# Patient Record
Sex: MALE | Race: WHITE | NOT HISPANIC OR LATINO
[De-identification: names, ages, dates, MRNs, and addresses within clinical notes are randomized per-mention and may not be internally consistent; named-entity substitution may affect disease eponyms.]

---

## 2017-01-05 ENCOUNTER — APPOINTMENT (OUTPATIENT)
Dept: FAMILY MEDICINE | Facility: CLINIC | Age: 37
End: 2017-01-05

## 2017-01-05 VITALS
DIASTOLIC BLOOD PRESSURE: 82 MMHG | HEART RATE: 95 BPM | BODY MASS INDEX: 25.2 KG/M2 | HEIGHT: 70 IN | OXYGEN SATURATION: 94 % | WEIGHT: 176 LBS | SYSTOLIC BLOOD PRESSURE: 130 MMHG

## 2017-01-13 ENCOUNTER — OUTPATIENT (OUTPATIENT)
Dept: OUTPATIENT SERVICES | Facility: HOSPITAL | Age: 37
LOS: 1 days | End: 2017-01-13
Payer: COMMERCIAL

## 2017-01-13 ENCOUNTER — APPOINTMENT (OUTPATIENT)
Dept: MRI IMAGING | Facility: CLINIC | Age: 37
End: 2017-01-13

## 2017-01-13 DIAGNOSIS — Z00.8 ENCOUNTER FOR OTHER GENERAL EXAMINATION: ICD-10-CM

## 2017-01-13 PROCEDURE — A9585: CPT

## 2017-01-13 PROCEDURE — 70553 MRI BRAIN STEM W/O & W/DYE: CPT

## 2017-01-24 ENCOUNTER — APPOINTMENT (OUTPATIENT)
Dept: FAMILY MEDICINE | Facility: CLINIC | Age: 37
End: 2017-01-24

## 2017-01-24 VITALS
HEIGHT: 70 IN | DIASTOLIC BLOOD PRESSURE: 82 MMHG | WEIGHT: 174 LBS | HEART RATE: 94 BPM | OXYGEN SATURATION: 96 % | BODY MASS INDEX: 24.91 KG/M2 | SYSTOLIC BLOOD PRESSURE: 140 MMHG

## 2017-01-31 ENCOUNTER — APPOINTMENT (OUTPATIENT)
Dept: FAMILY MEDICINE | Facility: CLINIC | Age: 37
End: 2017-01-31

## 2017-01-31 ENCOUNTER — NON-APPOINTMENT (OUTPATIENT)
Age: 37
End: 2017-01-31

## 2017-01-31 VITALS
BODY MASS INDEX: 24.91 KG/M2 | HEIGHT: 70 IN | WEIGHT: 174 LBS | DIASTOLIC BLOOD PRESSURE: 70 MMHG | SYSTOLIC BLOOD PRESSURE: 134 MMHG | HEART RATE: 79 BPM

## 2017-01-31 DIAGNOSIS — R06.2 WHEEZING: ICD-10-CM

## 2017-02-01 ENCOUNTER — FORM ENCOUNTER (OUTPATIENT)
Age: 37
End: 2017-02-01

## 2017-02-02 ENCOUNTER — OUTPATIENT (OUTPATIENT)
Dept: OUTPATIENT SERVICES | Facility: HOSPITAL | Age: 37
LOS: 1 days | End: 2017-02-02
Payer: COMMERCIAL

## 2017-02-02 ENCOUNTER — APPOINTMENT (OUTPATIENT)
Dept: MRI IMAGING | Facility: CLINIC | Age: 37
End: 2017-02-02

## 2017-02-02 DIAGNOSIS — R93.0 ABNORMAL FINDINGS ON DIAGNOSTIC IMAGING OF SKULL AND HEAD, NOT ELSEWHERE CLASSIFIED: ICD-10-CM

## 2017-02-02 PROCEDURE — A9585: CPT

## 2017-02-02 PROCEDURE — 70546 MR ANGIOGRAPH HEAD W/O&W/DYE: CPT

## 2017-02-09 ENCOUNTER — APPOINTMENT (OUTPATIENT)
Dept: FAMILY MEDICINE | Facility: CLINIC | Age: 37
End: 2017-02-09

## 2017-02-10 ENCOUNTER — APPOINTMENT (OUTPATIENT)
Dept: FAMILY MEDICINE | Facility: CLINIC | Age: 37
End: 2017-02-10

## 2017-02-10 VITALS
WEIGHT: 166 LBS | BODY MASS INDEX: 23.77 KG/M2 | HEART RATE: 99 BPM | HEIGHT: 70 IN | SYSTOLIC BLOOD PRESSURE: 120 MMHG | DIASTOLIC BLOOD PRESSURE: 70 MMHG | OXYGEN SATURATION: 98 %

## 2017-02-13 RX ORDER — IBUPROFEN 400 MG/1
400 TABLET, FILM COATED ORAL
Qty: 25 | Refills: 0 | Status: ACTIVE | COMMUNITY
Start: 2017-01-26

## 2017-02-15 ENCOUNTER — APPOINTMENT (OUTPATIENT)
Dept: FAMILY MEDICINE | Facility: CLINIC | Age: 37
End: 2017-02-15

## 2017-02-15 VITALS
OXYGEN SATURATION: 92 % | SYSTOLIC BLOOD PRESSURE: 120 MMHG | WEIGHT: 170 LBS | HEIGHT: 70 IN | DIASTOLIC BLOOD PRESSURE: 62 MMHG | BODY MASS INDEX: 24.34 KG/M2 | HEART RATE: 70 BPM

## 2017-02-15 DIAGNOSIS — Z87.09 PERSONAL HISTORY OF OTHER DISEASES OF THE RESPIRATORY SYSTEM: ICD-10-CM

## 2017-02-15 DIAGNOSIS — F41.9 ANXIETY DISORDER, UNSPECIFIED: ICD-10-CM

## 2017-03-07 ENCOUNTER — APPOINTMENT (OUTPATIENT)
Dept: FAMILY MEDICINE | Facility: CLINIC | Age: 37
End: 2017-03-07

## 2017-03-07 VITALS
TEMPERATURE: 99.7 F | HEIGHT: 70 IN | DIASTOLIC BLOOD PRESSURE: 70 MMHG | WEIGHT: 170 LBS | HEART RATE: 90 BPM | SYSTOLIC BLOOD PRESSURE: 120 MMHG | BODY MASS INDEX: 24.34 KG/M2 | OXYGEN SATURATION: 95 %

## 2017-03-07 RX ORDER — PREDNISONE 10 MG/1
10 TABLET ORAL
Qty: 20 | Refills: 0 | Status: COMPLETED | COMMUNITY
Start: 2017-01-31 | End: 2017-03-07

## 2017-03-07 RX ORDER — MILNACIPRAN HYDROCHLORIDE 12.5 MG/1
12.5 TABLET, FILM COATED ORAL
Qty: 120 | Refills: 0 | Status: DISCONTINUED | COMMUNITY
Start: 2016-12-20 | End: 2017-03-07

## 2017-03-10 ENCOUNTER — APPOINTMENT (OUTPATIENT)
Dept: PULMONOLOGY | Facility: CLINIC | Age: 37
End: 2017-03-10

## 2017-03-27 ENCOUNTER — APPOINTMENT (OUTPATIENT)
Dept: PULMONOLOGY | Facility: CLINIC | Age: 37
End: 2017-03-27

## 2017-03-27 VITALS — WEIGHT: 160 LBS | SYSTOLIC BLOOD PRESSURE: 120 MMHG | BODY MASS INDEX: 22.96 KG/M2 | DIASTOLIC BLOOD PRESSURE: 66 MMHG

## 2017-03-27 VITALS — HEART RATE: 105 BPM | OXYGEN SATURATION: 97 %

## 2017-03-27 RX ORDER — LIDOCAINE 5% 700 MG/1
5 PATCH TOPICAL
Qty: 1 | Refills: 3 | Status: DISCONTINUED | COMMUNITY
Start: 2017-01-05 | End: 2017-03-27

## 2017-03-27 RX ORDER — CYCLOBENZAPRINE HYDROCHLORIDE 10 MG/1
10 TABLET, FILM COATED ORAL
Qty: 30 | Refills: 0 | Status: DISCONTINUED | COMMUNITY
Start: 2017-02-15 | End: 2017-03-27

## 2017-04-04 ENCOUNTER — APPOINTMENT (OUTPATIENT)
Dept: RADIOLOGY | Facility: CLINIC | Age: 37
End: 2017-04-04

## 2017-05-16 ENCOUNTER — LABORATORY RESULT (OUTPATIENT)
Age: 37
End: 2017-05-16

## 2017-05-16 ENCOUNTER — APPOINTMENT (OUTPATIENT)
Dept: FAMILY MEDICINE | Facility: CLINIC | Age: 37
End: 2017-05-16

## 2017-05-16 VITALS
OXYGEN SATURATION: 92 % | BODY MASS INDEX: 23.62 KG/M2 | HEIGHT: 70 IN | HEART RATE: 70 BPM | DIASTOLIC BLOOD PRESSURE: 60 MMHG | SYSTOLIC BLOOD PRESSURE: 110 MMHG | WEIGHT: 165 LBS

## 2017-05-25 LAB
AMPHET UR-MCNC: NEGATIVE
BARBITURATES UR-MCNC: NEGATIVE
BENZODIAZ UR-MCNC: NORMAL
COCAINE METAB.OTHER UR-MCNC: NEGATIVE
CREATININE, URINE: >200 MG/DL
METHADONE UR-MCNC: NEGATIVE
METHAQUALONE UR-MCNC: NEGATIVE
OPIATES UR-MCNC: NEGATIVE
PCP UR-MCNC: NEGATIVE
PROPOXYPH UR QL: NEGATIVE
THC UR QL: NORMAL

## 2017-06-21 ENCOUNTER — APPOINTMENT (OUTPATIENT)
Dept: FAMILY MEDICINE | Facility: CLINIC | Age: 37
End: 2017-06-21

## 2017-06-25 ENCOUNTER — FORM ENCOUNTER (OUTPATIENT)
Age: 37
End: 2017-06-25

## 2017-06-26 ENCOUNTER — OUTPATIENT (OUTPATIENT)
Dept: OUTPATIENT SERVICES | Facility: HOSPITAL | Age: 37
LOS: 1 days | End: 2017-06-26
Payer: COMMERCIAL

## 2017-06-26 ENCOUNTER — APPOINTMENT (OUTPATIENT)
Dept: FAMILY MEDICINE | Facility: CLINIC | Age: 37
End: 2017-06-26

## 2017-06-26 ENCOUNTER — APPOINTMENT (OUTPATIENT)
Dept: RADIOLOGY | Facility: CLINIC | Age: 37
End: 2017-06-26

## 2017-06-26 VITALS
DIASTOLIC BLOOD PRESSURE: 62 MMHG | HEART RATE: 104 BPM | TEMPERATURE: 99.1 F | BODY MASS INDEX: 21.05 KG/M2 | HEIGHT: 70 IN | OXYGEN SATURATION: 97 % | WEIGHT: 147 LBS | SYSTOLIC BLOOD PRESSURE: 120 MMHG

## 2017-06-26 DIAGNOSIS — R63.4 ABNORMAL WEIGHT LOSS: ICD-10-CM

## 2017-06-26 DIAGNOSIS — Z00.8 ENCOUNTER FOR OTHER GENERAL EXAMINATION: ICD-10-CM

## 2017-06-26 PROCEDURE — 71046 X-RAY EXAM CHEST 2 VIEWS: CPT

## 2017-06-27 LAB
ALBUMIN SERPL ELPH-MCNC: 4.6 G/DL
ALP BLD-CCNC: 78 U/L
ALT SERPL-CCNC: 9 U/L
ANION GAP SERPL CALC-SCNC: 17 MMOL/L
AST SERPL-CCNC: 15 U/L
BASOPHILS # BLD AUTO: 0.03 K/UL
BASOPHILS NFR BLD AUTO: 0.2 %
BILIRUB SERPL-MCNC: 0.5 MG/DL
BUN SERPL-MCNC: 23 MG/DL
CALCIUM SERPL-MCNC: 9 MG/DL
CHLORIDE SERPL-SCNC: 101 MMOL/L
CO2 SERPL-SCNC: 23 MMOL/L
CREAT SERPL-MCNC: 0.93 MG/DL
CRP SERPL-MCNC: <0.2 MG/DL
EOSINOPHIL # BLD AUTO: 0.28 K/UL
EOSINOPHIL NFR BLD AUTO: 2 %
ERYTHROCYTE [SEDIMENTATION RATE] IN BLOOD BY WESTERGREN METHOD: 2 MM/HR
GLUCOSE SERPL-MCNC: 123 MG/DL
HCT VFR BLD CALC: 47.5 %
HGB BLD-MCNC: 15.9 G/DL
IMM GRANULOCYTES NFR BLD AUTO: 0.4 %
LYMPHOCYTES # BLD AUTO: 3.09 K/UL
LYMPHOCYTES NFR BLD AUTO: 22.3 %
MAN DIFF?: NORMAL
MCHC RBC-ENTMCNC: 30.5 PG
MCHC RBC-ENTMCNC: 33.5 GM/DL
MCV RBC AUTO: 91 FL
MONOCYTES # BLD AUTO: 0.81 K/UL
MONOCYTES NFR BLD AUTO: 5.8 %
NEUTROPHILS # BLD AUTO: 9.58 K/UL
NEUTROPHILS NFR BLD AUTO: 69.3 %
PLATELET # BLD AUTO: 359 K/UL
POTASSIUM SERPL-SCNC: 4 MMOL/L
PREALB SERPL NEPH-MCNC: 26 MG/DL
PROT SERPL-MCNC: 7.3 G/DL
RBC # BLD: 5.22 M/UL
RBC # FLD: 14.4 %
SODIUM SERPL-SCNC: 141 MMOL/L
WBC # FLD AUTO: 13.85 K/UL

## 2017-07-10 ENCOUNTER — APPOINTMENT (OUTPATIENT)
Dept: FAMILY MEDICINE | Facility: CLINIC | Age: 37
End: 2017-07-10

## 2017-07-10 VITALS
HEART RATE: 104 BPM | SYSTOLIC BLOOD PRESSURE: 122 MMHG | HEIGHT: 70 IN | BODY MASS INDEX: 23.91 KG/M2 | OXYGEN SATURATION: 96 % | WEIGHT: 167 LBS | TEMPERATURE: 98.9 F | DIASTOLIC BLOOD PRESSURE: 62 MMHG

## 2017-07-10 DIAGNOSIS — M62.81 MUSCLE WEAKNESS (GENERALIZED): ICD-10-CM

## 2017-07-10 RX ORDER — TESTOSTERONE 25 MG/2.5G
25 MG/2.5GM GEL TRANSDERMAL
Qty: 75 | Refills: 0 | Status: COMPLETED | COMMUNITY
Start: 2017-01-04 | End: 2017-07-10

## 2017-07-11 LAB
ALBUMIN SERPL ELPH-MCNC: 4.4 G/DL
ALP BLD-CCNC: 68 U/L
ALT SERPL-CCNC: 43 U/L
ANION GAP SERPL CALC-SCNC: 19 MMOL/L
AST SERPL-CCNC: 24 U/L
BASOPHILS # BLD AUTO: 0.04 K/UL
BASOPHILS NFR BLD AUTO: 0.3 %
BILIRUB SERPL-MCNC: 0.2 MG/DL
BUN SERPL-MCNC: 23 MG/DL
CALCIUM SERPL-MCNC: 9.3 MG/DL
CHLORIDE SERPL-SCNC: 100 MMOL/L
CO2 SERPL-SCNC: 22 MMOL/L
CREAT SERPL-MCNC: 1.1 MG/DL
EOSINOPHIL # BLD AUTO: 0.38 K/UL
EOSINOPHIL NFR BLD AUTO: 3.1 %
GLUCOSE SERPL-MCNC: 101 MG/DL
HCT VFR BLD CALC: 41 %
HGB BLD-MCNC: 13.5 G/DL
IMM GRANULOCYTES NFR BLD AUTO: 0.2 %
LYMPHOCYTES # BLD AUTO: 4.05 K/UL
LYMPHOCYTES NFR BLD AUTO: 33.6 %
MAN DIFF?: NORMAL
MCHC RBC-ENTMCNC: 29.7 PG
MCHC RBC-ENTMCNC: 32.9 GM/DL
MCV RBC AUTO: 90.1 FL
MONOCYTES # BLD AUTO: 0.91 K/UL
MONOCYTES NFR BLD AUTO: 7.5 %
NEUTROPHILS # BLD AUTO: 6.66 K/UL
NEUTROPHILS NFR BLD AUTO: 55.3 %
PLATELET # BLD AUTO: 305 K/UL
POTASSIUM SERPL-SCNC: 4.3 MMOL/L
PROT SERPL-MCNC: 6.7 G/DL
RBC # BLD: 4.55 M/UL
RBC # FLD: 14.2 %
SODIUM SERPL-SCNC: 141 MMOL/L
WBC # FLD AUTO: 12.07 K/UL

## 2017-07-26 ENCOUNTER — APPOINTMENT (OUTPATIENT)
Dept: FAMILY MEDICINE | Facility: CLINIC | Age: 37
End: 2017-07-26

## 2017-08-02 ENCOUNTER — OTHER (OUTPATIENT)
Age: 37
End: 2017-08-02

## 2017-08-17 ENCOUNTER — APPOINTMENT (OUTPATIENT)
Dept: FAMILY MEDICINE | Facility: CLINIC | Age: 37
End: 2017-08-17
Payer: COMMERCIAL

## 2017-08-17 VITALS
OXYGEN SATURATION: 97 % | BODY MASS INDEX: 25.34 KG/M2 | HEIGHT: 70 IN | DIASTOLIC BLOOD PRESSURE: 84 MMHG | SYSTOLIC BLOOD PRESSURE: 136 MMHG | WEIGHT: 177 LBS | HEART RATE: 88 BPM

## 2017-08-17 DIAGNOSIS — F17.200 NICOTINE DEPENDENCE, UNSPECIFIED, UNCOMPLICATED: ICD-10-CM

## 2017-08-17 PROCEDURE — 99214 OFFICE O/P EST MOD 30 MIN: CPT

## 2017-08-18 LAB
ALBUMIN SERPL ELPH-MCNC: 4.8 G/DL
ALP BLD-CCNC: 80 U/L
ALT SERPL-CCNC: 12 U/L
ANION GAP SERPL CALC-SCNC: 17 MMOL/L
AST SERPL-CCNC: 23 U/L
BASOPHILS # BLD AUTO: 0.05 K/UL
BASOPHILS NFR BLD AUTO: 0.5 %
BILIRUB SERPL-MCNC: 0.2 MG/DL
BUN SERPL-MCNC: 20 MG/DL
CALCIUM SERPL-MCNC: 9.7 MG/DL
CHLORIDE SERPL-SCNC: 99 MMOL/L
CO2 SERPL-SCNC: 25 MMOL/L
CREAT SERPL-MCNC: 1.29 MG/DL
EOSINOPHIL # BLD AUTO: 0.24 K/UL
EOSINOPHIL NFR BLD AUTO: 2.3 %
GLUCOSE SERPL-MCNC: 85 MG/DL
HCT VFR BLD CALC: 46.4 %
HGB BLD-MCNC: 15.1 G/DL
IMM GRANULOCYTES NFR BLD AUTO: 0.4 %
LYMPHOCYTES # BLD AUTO: 3.25 K/UL
LYMPHOCYTES NFR BLD AUTO: 30.5 %
MAN DIFF?: NORMAL
MCHC RBC-ENTMCNC: 30 PG
MCHC RBC-ENTMCNC: 32.5 GM/DL
MCV RBC AUTO: 92.1 FL
MONOCYTES # BLD AUTO: 0.72 K/UL
MONOCYTES NFR BLD AUTO: 6.8 %
NEUTROPHILS # BLD AUTO: 6.36 K/UL
NEUTROPHILS NFR BLD AUTO: 59.5 %
PLATELET # BLD AUTO: 289 K/UL
POTASSIUM SERPL-SCNC: 5.1 MMOL/L
PROT SERPL-MCNC: 7.3 G/DL
RBC # BLD: 5.04 M/UL
RBC # FLD: 14 %
SODIUM SERPL-SCNC: 141 MMOL/L
WBC # FLD AUTO: 10.66 K/UL

## 2017-08-29 PROBLEM — F17.200 NICOTINE DEPENDENCE: Status: ACTIVE | Noted: 2017-03-27

## 2017-09-12 ENCOUNTER — APPOINTMENT (OUTPATIENT)
Dept: GASTROENTEROLOGY | Facility: CLINIC | Age: 37
End: 2017-09-12

## 2017-09-15 ENCOUNTER — APPOINTMENT (OUTPATIENT)
Dept: FAMILY MEDICINE | Facility: CLINIC | Age: 37
End: 2017-09-15
Payer: COMMERCIAL

## 2017-09-15 VITALS
WEIGHT: 180 LBS | HEART RATE: 76 BPM | SYSTOLIC BLOOD PRESSURE: 120 MMHG | DIASTOLIC BLOOD PRESSURE: 82 MMHG | OXYGEN SATURATION: 98 % | HEIGHT: 70 IN | BODY MASS INDEX: 25.77 KG/M2

## 2017-09-15 PROCEDURE — 99406 BEHAV CHNG SMOKING 3-10 MIN: CPT

## 2017-09-15 PROCEDURE — 99214 OFFICE O/P EST MOD 30 MIN: CPT

## 2017-09-28 ENCOUNTER — APPOINTMENT (OUTPATIENT)
Dept: PULMONOLOGY | Facility: CLINIC | Age: 37
End: 2017-09-28

## 2017-10-19 ENCOUNTER — APPOINTMENT (OUTPATIENT)
Dept: FAMILY MEDICINE | Facility: CLINIC | Age: 37
End: 2017-10-19
Payer: COMMERCIAL

## 2017-10-19 VITALS
SYSTOLIC BLOOD PRESSURE: 128 MMHG | HEART RATE: 101 BPM | OXYGEN SATURATION: 96 % | DIASTOLIC BLOOD PRESSURE: 64 MMHG | BODY MASS INDEX: 26.05 KG/M2 | WEIGHT: 182 LBS | HEIGHT: 70 IN

## 2017-10-19 DIAGNOSIS — Z00.00 ENCOUNTER FOR GENERAL ADULT MEDICAL EXAMINATION W/OUT ABNORMAL FINDINGS: ICD-10-CM

## 2017-10-19 PROCEDURE — 99214 OFFICE O/P EST MOD 30 MIN: CPT

## 2017-10-19 RX ORDER — TESTOSTERONE 30 MG/1.5ML
30 SOLUTION TOPICAL
Refills: 0 | Status: DISCONTINUED | COMMUNITY
End: 2017-10-19

## 2017-10-21 LAB
25(OH)D3 SERPL-MCNC: 35.1 NG/ML
ALBUMIN SERPL ELPH-MCNC: 4.8 G/DL
ALP BLD-CCNC: 84 U/L
ALT SERPL-CCNC: 13 U/L
ANION GAP SERPL CALC-SCNC: 17 MMOL/L
AST SERPL-CCNC: 15 U/L
BASOPHILS # BLD AUTO: 0.04 K/UL
BASOPHILS NFR BLD AUTO: 0.3 %
BILIRUB SERPL-MCNC: 0.2 MG/DL
BUN SERPL-MCNC: 14 MG/DL
CALCIUM SERPL-MCNC: 9.4 MG/DL
CHLORIDE SERPL-SCNC: 103 MMOL/L
CO2 SERPL-SCNC: 23 MMOL/L
CREAT SERPL-MCNC: 1.54 MG/DL
EOSINOPHIL # BLD AUTO: 0.13 K/UL
EOSINOPHIL NFR BLD AUTO: 0.8 %
GLUCOSE SERPL-MCNC: 98 MG/DL
HCT VFR BLD CALC: 45.3 %
HGB BLD-MCNC: 15.8 G/DL
IMM GRANULOCYTES NFR BLD AUTO: 0.3 %
LYMPHOCYTES # BLD AUTO: 3.47 K/UL
LYMPHOCYTES NFR BLD AUTO: 22.2 %
MAN DIFF?: NORMAL
MCHC RBC-ENTMCNC: 32.2 PG
MCHC RBC-ENTMCNC: 34.9 GM/DL
MCV RBC AUTO: 92.3 FL
MONOCYTES # BLD AUTO: 0.93 K/UL
MONOCYTES NFR BLD AUTO: 6 %
NEUTROPHILS # BLD AUTO: 10.98 K/UL
NEUTROPHILS NFR BLD AUTO: 70.4 %
PLATELET # BLD AUTO: 351 K/UL
POTASSIUM SERPL-SCNC: 4.2 MMOL/L
PROT SERPL-MCNC: 7.2 G/DL
RBC # BLD: 4.91 M/UL
RBC # FLD: 13.4 %
SODIUM SERPL-SCNC: 143 MMOL/L
WBC # FLD AUTO: 15.6 K/UL

## 2017-11-20 ENCOUNTER — APPOINTMENT (OUTPATIENT)
Dept: HEMATOLOGY ONCOLOGY | Facility: CLINIC | Age: 37
End: 2017-11-20
Payer: COMMERCIAL

## 2017-11-20 ENCOUNTER — LABORATORY RESULT (OUTPATIENT)
Age: 37
End: 2017-11-20

## 2017-11-20 VITALS
BODY MASS INDEX: 26.52 KG/M2 | HEART RATE: 102 BPM | HEIGHT: 70 IN | TEMPERATURE: 98.7 F | SYSTOLIC BLOOD PRESSURE: 150 MMHG | WEIGHT: 185.25 LBS | DIASTOLIC BLOOD PRESSURE: 78 MMHG

## 2017-11-20 DIAGNOSIS — Z80.8 FAMILY HISTORY OF MALIGNANT NEOPLASM OF OTHER ORGANS OR SYSTEMS: ICD-10-CM

## 2017-11-20 DIAGNOSIS — R51 HEADACHE: ICD-10-CM

## 2017-11-20 DIAGNOSIS — Z86.2 PERSONAL HISTORY OF DISEASES OF THE BLOOD AND BLOOD-FORMING ORGANS AND CERTAIN DISORDERS INVOLVING THE IMMUNE MECHANISM: ICD-10-CM

## 2017-11-20 DIAGNOSIS — Z87.09 PERSONAL HISTORY OF OTHER DISEASES OF THE RESPIRATORY SYSTEM: ICD-10-CM

## 2017-11-20 LAB
HCT VFR BLD CALC: 51.9 %
HGB BLD-MCNC: 17.1 G/DL
MCHC RBC-ENTMCNC: 30.6 PG
MCHC RBC-ENTMCNC: 32.9 GM/DL
MCV RBC AUTO: 92.9 FL
PLATELET # BLD AUTO: 351 K/UL
RBC # BLD: 5.59 M/UL
RBC # FLD: 11.8 %
WBC # FLD AUTO: 11.7 K/UL

## 2017-11-20 PROCEDURE — 99204 OFFICE O/P NEW MOD 45 MIN: CPT | Mod: 25

## 2017-11-20 PROCEDURE — 85025 COMPLETE CBC W/AUTO DIFF WBC: CPT

## 2017-11-20 PROCEDURE — 36415 COLL VENOUS BLD VENIPUNCTURE: CPT

## 2017-11-20 RX ORDER — TESTOSTERONE CYPIONATE, ALCOHOL 200 MG/ML
200 KIT INTRAMUSCULAR
Refills: 0 | Status: ACTIVE | COMMUNITY
Start: 2017-11-20

## 2017-11-20 RX ORDER — VARENICLINE TARTRATE 0.5 (11)-1
0.5 MG X 11 & KIT ORAL
Qty: 1 | Refills: 0 | Status: DISCONTINUED | COMMUNITY
Start: 2017-08-17 | End: 2017-11-20

## 2017-11-21 LAB
ALBUMIN SERPL ELPH-MCNC: 4.8 G/DL
ALP BLD-CCNC: 89 U/L
ALT SERPL-CCNC: 14 U/L
ANION GAP SERPL CALC-SCNC: 15 MMOL/L
AST SERPL-CCNC: 18 U/L
BILIRUB SERPL-MCNC: 0.2 MG/DL
BUN SERPL-MCNC: 13 MG/DL
CALCIUM SERPL-MCNC: 9.1 MG/DL
CHLORIDE SERPL-SCNC: 104 MMOL/L
CO2 SERPL-SCNC: 24 MMOL/L
CREAT SERPL-MCNC: 0.94 MG/DL
GLUCOSE SERPL-MCNC: 91 MG/DL
HAV IGM SER QL: NONREACTIVE
HAV IGM SER QL: NONREACTIVE
HBV CORE IGG+IGM SER QL: NONREACTIVE
HBV CORE IGM SER QL: NONREACTIVE
HBV CORE IGM SER QL: NONREACTIVE
HBV SURFACE AB SER QL: REACTIVE
HBV SURFACE AG SER QL: NONREACTIVE
HBV SURFACE AG SER QL: NONREACTIVE
HCV AB SER QL: NONREACTIVE
HCV S/CO RATIO: 0.16 S/CO
LDH SERPL-CCNC: 191 U/L
POTASSIUM SERPL-SCNC: 4.2 MMOL/L
PROT SERPL-MCNC: 7.3 G/DL
SODIUM SERPL-SCNC: 143 MMOL/L

## 2017-11-22 LAB — HBV E AG SER QL: NEGATIVE

## 2017-11-27 LAB
HCV RNA SERPL NAA DL=5-ACNC: NOT DETECTED IU/ML
HCV RNA SERPL NAA+PROBE-LOG IU: NOT DETECTED LOGIU/ML

## 2017-12-21 ENCOUNTER — LABORATORY RESULT (OUTPATIENT)
Age: 37
End: 2017-12-21

## 2017-12-21 ENCOUNTER — APPOINTMENT (OUTPATIENT)
Dept: HEMATOLOGY ONCOLOGY | Facility: CLINIC | Age: 37
End: 2017-12-21
Payer: COMMERCIAL

## 2017-12-21 VITALS
DIASTOLIC BLOOD PRESSURE: 77 MMHG | HEIGHT: 70 IN | HEART RATE: 105 BPM | WEIGHT: 190 LBS | BODY MASS INDEX: 27.2 KG/M2 | SYSTOLIC BLOOD PRESSURE: 151 MMHG | TEMPERATURE: 99.1 F

## 2017-12-21 LAB
HCT VFR BLD CALC: 48.8 %
HGB BLD-MCNC: 16.3 G/DL
MCHC RBC-ENTMCNC: 30.4 PG
MCHC RBC-ENTMCNC: 33.5 GM/DL
MCV RBC AUTO: 90.9 FL
PLATELET # BLD AUTO: 309 K/UL
RBC # BLD: 5.37 M/UL
RBC # FLD: 12.4 %
WBC # FLD AUTO: 15.3 K/UL

## 2017-12-21 PROCEDURE — 99215 OFFICE O/P EST HI 40 MIN: CPT | Mod: 25

## 2017-12-21 PROCEDURE — 85025 COMPLETE CBC W/AUTO DIFF WBC: CPT

## 2017-12-21 PROCEDURE — 36415 COLL VENOUS BLD VENIPUNCTURE: CPT

## 2018-01-16 ENCOUNTER — APPOINTMENT (OUTPATIENT)
Dept: FAMILY MEDICINE | Facility: CLINIC | Age: 38
End: 2018-01-16

## 2018-01-22 ENCOUNTER — APPOINTMENT (OUTPATIENT)
Dept: FAMILY MEDICINE | Facility: CLINIC | Age: 38
End: 2018-01-22
Payer: COMMERCIAL

## 2018-01-22 VITALS
HEIGHT: 70 IN | OXYGEN SATURATION: 94 % | TEMPERATURE: 98.6 F | WEIGHT: 191 LBS | HEART RATE: 105 BPM | BODY MASS INDEX: 27.35 KG/M2

## 2018-01-22 PROCEDURE — 99214 OFFICE O/P EST MOD 30 MIN: CPT

## 2018-01-22 RX ORDER — BUDESONIDE AND FORMOTEROL FUMARATE DIHYDRATE 160; 4.5 UG/1; UG/1
160-4.5 AEROSOL RESPIRATORY (INHALATION) TWICE DAILY
Qty: 1 | Refills: 3 | Status: DISCONTINUED | COMMUNITY
Start: 2017-02-10 | End: 2018-01-22

## 2018-01-26 ENCOUNTER — APPOINTMENT (OUTPATIENT)
Dept: HEMATOLOGY ONCOLOGY | Facility: CLINIC | Age: 38
End: 2018-01-26

## 2018-02-22 ENCOUNTER — APPOINTMENT (OUTPATIENT)
Dept: FAMILY MEDICINE | Facility: CLINIC | Age: 38
End: 2018-02-22
Payer: COMMERCIAL

## 2018-02-22 VITALS
WEIGHT: 194 LBS | HEIGHT: 70 IN | BODY MASS INDEX: 27.77 KG/M2 | OXYGEN SATURATION: 94 % | SYSTOLIC BLOOD PRESSURE: 136 MMHG | DIASTOLIC BLOOD PRESSURE: 78 MMHG | HEART RATE: 83 BPM

## 2018-02-22 DIAGNOSIS — Z72.0 TOBACCO USE: ICD-10-CM

## 2018-02-22 PROCEDURE — 99214 OFFICE O/P EST MOD 30 MIN: CPT

## 2018-02-24 PROBLEM — Z72.0 TOBACCO ABUSE: Status: ACTIVE | Noted: 2017-09-17

## 2018-04-10 ENCOUNTER — APPOINTMENT (OUTPATIENT)
Dept: FAMILY MEDICINE | Facility: CLINIC | Age: 38
End: 2018-04-10
Payer: COMMERCIAL

## 2018-04-10 VITALS
HEIGHT: 70 IN | DIASTOLIC BLOOD PRESSURE: 74 MMHG | SYSTOLIC BLOOD PRESSURE: 134 MMHG | BODY MASS INDEX: 27.2 KG/M2 | OXYGEN SATURATION: 95 % | HEART RATE: 92 BPM | WEIGHT: 190 LBS

## 2018-04-10 PROCEDURE — 36415 COLL VENOUS BLD VENIPUNCTURE: CPT

## 2018-04-10 PROCEDURE — 99214 OFFICE O/P EST MOD 30 MIN: CPT | Mod: 25

## 2018-04-10 RX ORDER — DULOXETINE HYDROCHLORIDE 30 MG/1
30 CAPSULE, DELAYED RELEASE PELLETS ORAL
Qty: 30 | Refills: 1 | Status: DISCONTINUED | COMMUNITY
Start: 2018-01-22 | End: 2018-04-10

## 2018-04-26 ENCOUNTER — APPOINTMENT (OUTPATIENT)
Dept: FAMILY MEDICINE | Facility: CLINIC | Age: 38
End: 2018-04-26
Payer: COMMERCIAL

## 2018-04-26 VITALS
BODY MASS INDEX: 26.92 KG/M2 | HEIGHT: 70 IN | DIASTOLIC BLOOD PRESSURE: 86 MMHG | OXYGEN SATURATION: 97 % | SYSTOLIC BLOOD PRESSURE: 138 MMHG | HEART RATE: 86 BPM | WEIGHT: 188 LBS

## 2018-04-26 LAB
25(OH)D3 SERPL-MCNC: 30.3 NG/ML
ALBUMIN SERPL ELPH-MCNC: 5.1 G/DL
ALP BLD-CCNC: 95 U/L
ALT SERPL-CCNC: 19 U/L
AMPHET UR-MCNC: NEGATIVE
ANION GAP SERPL CALC-SCNC: 18 MMOL/L
APPEARANCE: CLEAR
AST SERPL-CCNC: 22 U/L
BACTERIA: NEGATIVE
BARBITURATES UR-MCNC: NEGATIVE
BASOPHILS # BLD AUTO: 0.05 K/UL
BASOPHILS NFR BLD AUTO: 0.3 %
BENZODIAZ UR-MCNC: NEGATIVE
BILIRUB SERPL-MCNC: 0.3 MG/DL
BILIRUBIN URINE: NEGATIVE
BLOOD URINE: NEGATIVE
BUN SERPL-MCNC: 8 MG/DL
CALCIUM SERPL-MCNC: 8.8 MG/DL
CHLORIDE SERPL-SCNC: 97 MMOL/L
CHOLEST SERPL-MCNC: 216 MG/DL
CHOLEST/HDLC SERPL: 4.1 RATIO
CO2 SERPL-SCNC: 25 MMOL/L
COCAINE METAB.OTHER UR-MCNC: NEGATIVE
COLOR: YELLOW
CREAT SERPL-MCNC: 1.06 MG/DL
CREATININE, URINE: 123.2 MG/DL
EOSINOPHIL # BLD AUTO: 0.31 K/UL
EOSINOPHIL NFR BLD AUTO: 1.9 %
FOLATE SERPL-MCNC: 8.8 NG/ML
FSH SERPL-MCNC: <0.1 IU/L
GLUCOSE QUALITATIVE U: NEGATIVE MG/DL
GLUCOSE SERPL-MCNC: 63 MG/DL
HCT VFR BLD CALC: 49.3 %
HDLC SERPL-MCNC: 53 MG/DL
HGB BLD-MCNC: 16.7 G/DL
HYALINE CASTS: 0 /LPF
IMM GRANULOCYTES NFR BLD AUTO: 0.4 %
KETONES URINE: ABNORMAL
LDLC SERPL CALC-MCNC: 127 MG/DL
LEUKOCYTE ESTERASE URINE: NEGATIVE
LH SERPL-ACNC: <0.1 IU/L
LYMPHOCYTES # BLD AUTO: 3.35 K/UL
LYMPHOCYTES NFR BLD AUTO: 20.1 %
MAN DIFF?: NORMAL
MCHC RBC-ENTMCNC: 31 PG
MCHC RBC-ENTMCNC: 33.9 GM/DL
MCV RBC AUTO: 91.6 FL
METHADONE UR-MCNC: NEGATIVE
METHAQUALONE UR-MCNC: NEGATIVE
MICROSCOPIC-UA: NORMAL
MONOCYTES # BLD AUTO: 1.21 K/UL
MONOCYTES NFR BLD AUTO: 7.3 %
NEUTROPHILS # BLD AUTO: 11.67 K/UL
NEUTROPHILS NFR BLD AUTO: 70 %
NITRITE URINE: NEGATIVE
OPIATES UR-MCNC: NEGATIVE
PCP UR-MCNC: NEGATIVE
PH URINE: 6
PLATELET # BLD AUTO: 343 K/UL
POTASSIUM SERPL-SCNC: 4 MMOL/L
PROPOXYPH UR QL: NEGATIVE
PROT SERPL-MCNC: 8.1 G/DL
PROTEIN URINE: NEGATIVE MG/DL
RBC # BLD: 5.38 M/UL
RBC # FLD: 14.6 %
RED BLOOD CELLS URINE: 2 /HPF
SODIUM SERPL-SCNC: 140 MMOL/L
SPECIFIC GRAVITY URINE: 1.02
SQUAMOUS EPITHELIAL CELLS: 1 /HPF
TESTOST BND SERPL-MCNC: 35.7 PG/ML
TESTOST SERPL-MCNC: 1476.8 NG/DL
THC UR QL: NEGATIVE
TRIGL SERPL-MCNC: 181 MG/DL
TSH SERPL-ACNC: 3.39 UIU/ML
UROBILINOGEN URINE: NEGATIVE MG/DL
VIT B12 SERPL-MCNC: 548 PG/ML
WBC # FLD AUTO: 16.65 K/UL
WHITE BLOOD CELLS URINE: 1 /HPF

## 2018-04-26 PROCEDURE — 99214 OFFICE O/P EST MOD 30 MIN: CPT

## 2018-04-27 ENCOUNTER — APPOINTMENT (OUTPATIENT)
Dept: FAMILY MEDICINE | Facility: CLINIC | Age: 38
End: 2018-04-27
Payer: COMMERCIAL

## 2018-04-27 VITALS
HEART RATE: 96 BPM | OXYGEN SATURATION: 95 % | DIASTOLIC BLOOD PRESSURE: 76 MMHG | BODY MASS INDEX: 26.92 KG/M2 | SYSTOLIC BLOOD PRESSURE: 148 MMHG | WEIGHT: 188 LBS | HEIGHT: 70 IN

## 2018-04-27 DIAGNOSIS — R93.0 ABNORMAL FINDINGS ON DIAGNOSTIC IMAGING OF SKULL AND HEAD, NOT ELSEWHERE CLASSIFIED: ICD-10-CM

## 2018-04-27 PROCEDURE — 99214 OFFICE O/P EST MOD 30 MIN: CPT

## 2018-04-30 PROBLEM — R93.0 ABNORMAL MRI OF HEAD: Status: ACTIVE | Noted: 2017-01-31

## 2018-05-01 ENCOUNTER — APPOINTMENT (OUTPATIENT)
Dept: FAMILY MEDICINE | Facility: CLINIC | Age: 38
End: 2018-05-01

## 2018-05-01 ENCOUNTER — APPOINTMENT (OUTPATIENT)
Dept: UROLOGY | Facility: CLINIC | Age: 38
End: 2018-05-01

## 2018-05-04 ENCOUNTER — APPOINTMENT (OUTPATIENT)
Dept: PAIN MANAGEMENT | Facility: CLINIC | Age: 38
End: 2018-05-04
Payer: COMMERCIAL

## 2018-05-04 DIAGNOSIS — F41.8 OTHER SPECIFIED ANXIETY DISORDERS: ICD-10-CM

## 2018-05-04 PROCEDURE — 99204 OFFICE O/P NEW MOD 45 MIN: CPT

## 2018-05-11 ENCOUNTER — APPOINTMENT (OUTPATIENT)
Dept: FAMILY MEDICINE | Facility: CLINIC | Age: 38
End: 2018-05-11

## 2018-05-15 ENCOUNTER — APPOINTMENT (OUTPATIENT)
Dept: FAMILY MEDICINE | Facility: CLINIC | Age: 38
End: 2018-05-15
Payer: COMMERCIAL

## 2018-05-15 VITALS
HEIGHT: 70 IN | OXYGEN SATURATION: 95 % | BODY MASS INDEX: 27.63 KG/M2 | WEIGHT: 193 LBS | DIASTOLIC BLOOD PRESSURE: 76 MMHG | SYSTOLIC BLOOD PRESSURE: 138 MMHG | HEART RATE: 108 BPM

## 2018-05-15 DIAGNOSIS — E34.9 ENDOCRINE DISORDER, UNSPECIFIED: ICD-10-CM

## 2018-05-15 PROCEDURE — 99214 OFFICE O/P EST MOD 30 MIN: CPT

## 2018-05-15 RX ORDER — ALBUTEROL SULFATE 90 UG/1
108 (90 BASE) AEROSOL, METERED RESPIRATORY (INHALATION)
Qty: 6.7 | Refills: 3 | Status: ACTIVE | COMMUNITY
Start: 2017-03-27 | End: 1900-01-01

## 2018-05-22 ENCOUNTER — APPOINTMENT (OUTPATIENT)
Dept: NEUROLOGY | Facility: CLINIC | Age: 38
End: 2018-05-22

## 2018-05-30 NOTE — PHYSICAL EXAM

## 2018-05-30 NOTE — REVIEW OF SYSTEMS
[Nasal Discharge] : nasal discharge [Wheezing] : wheezing [Skin Rash] : skin rash [Anxiety] : anxiety [Negative] : Cardiovascular

## 2018-05-30 NOTE — HISTORY OF PRESENT ILLNESS
[FreeTextEntry1] : Patient here for follow up on  Anxiety  [de-identified] : saw Dr. webster on May 10th\par \par \par has rash in groin region\par \par

## 2018-06-12 ENCOUNTER — APPOINTMENT (OUTPATIENT)
Dept: UROLOGY | Facility: CLINIC | Age: 38
End: 2018-06-12

## 2018-06-19 ENCOUNTER — APPOINTMENT (OUTPATIENT)
Dept: FAMILY MEDICINE | Facility: CLINIC | Age: 38
End: 2018-06-19
Payer: COMMERCIAL

## 2018-06-19 ENCOUNTER — LABORATORY RESULT (OUTPATIENT)
Age: 38
End: 2018-06-19

## 2018-06-19 VITALS
OXYGEN SATURATION: 97 % | BODY MASS INDEX: 26.48 KG/M2 | SYSTOLIC BLOOD PRESSURE: 138 MMHG | HEART RATE: 94 BPM | DIASTOLIC BLOOD PRESSURE: 76 MMHG | HEIGHT: 70 IN | WEIGHT: 185 LBS

## 2018-06-19 PROCEDURE — 99214 OFFICE O/P EST MOD 30 MIN: CPT

## 2018-06-19 RX ORDER — NYSTATIN 100000 U/G
100000 OINTMENT TOPICAL
Qty: 1 | Refills: 3 | Status: COMPLETED | COMMUNITY
Start: 2018-05-15 | End: 2018-06-19

## 2018-06-19 RX ORDER — CEPHALEXIN 500 MG/1
500 CAPSULE ORAL
Qty: 14 | Refills: 0 | Status: COMPLETED | COMMUNITY
Start: 2018-05-15 | End: 2018-06-19

## 2018-06-20 ENCOUNTER — OTHER (OUTPATIENT)
Age: 38
End: 2018-06-20

## 2018-06-20 DIAGNOSIS — R73.9 HYPERGLYCEMIA, UNSPECIFIED: ICD-10-CM

## 2018-06-20 LAB
25(OH)D3 SERPL-MCNC: 37.4 NG/ML
ALBUMIN SERPL ELPH-MCNC: 4.7 G/DL
ALP BLD-CCNC: 76 U/L
ALT SERPL-CCNC: 15 U/L
ANION GAP SERPL CALC-SCNC: 18 MMOL/L
AST SERPL-CCNC: 18 U/L
B BURGDOR IGG+IGM SER QL IB: NORMAL
BASOPHILS # BLD AUTO: 0.04 K/UL
BASOPHILS NFR BLD AUTO: 0.4 %
BILIRUB SERPL-MCNC: 0.2 MG/DL
BUN SERPL-MCNC: 14 MG/DL
CALCIUM SERPL-MCNC: 8.7 MG/DL
CHLORIDE SERPL-SCNC: 101 MMOL/L
CO2 SERPL-SCNC: 23 MMOL/L
CREAT SERPL-MCNC: 1.01 MG/DL
EOSINOPHIL # BLD AUTO: 0.17 K/UL
EOSINOPHIL NFR BLD AUTO: 1.9 %
GLUCOSE SERPL-MCNC: 168 MG/DL
HCT VFR BLD CALC: 46.5 %
HGB BLD-MCNC: 15.3 G/DL
IMM GRANULOCYTES NFR BLD AUTO: 0.2 %
LYMPHOCYTES # BLD AUTO: 2.16 K/UL
LYMPHOCYTES NFR BLD AUTO: 24 %
MAN DIFF?: NORMAL
MCHC RBC-ENTMCNC: 30.1 PG
MCHC RBC-ENTMCNC: 32.9 GM/DL
MCV RBC AUTO: 91.4 FL
MONOCYTES # BLD AUTO: 0.77 K/UL
MONOCYTES NFR BLD AUTO: 8.5 %
NEUTROPHILS # BLD AUTO: 5.85 K/UL
NEUTROPHILS NFR BLD AUTO: 65 %
PLATELET # BLD AUTO: 303 K/UL
POTASSIUM SERPL-SCNC: 4.3 MMOL/L
PROT SERPL-MCNC: 7.1 G/DL
RBC # BLD: 5.09 M/UL
RBC # FLD: 13.8 %
SODIUM SERPL-SCNC: 142 MMOL/L
WBC # FLD AUTO: 9.01 K/UL

## 2018-06-21 LAB
ANA SER IF-ACNC: NEGATIVE
HBA1C MFR BLD HPLC: 5.4 %

## 2018-06-24 LAB
AMPHET UR-MCNC: NEGATIVE
BARBITURATES UR-MCNC: NEGATIVE
BENZODIAZ UR-MCNC: NEGATIVE
COCAINE METAB.OTHER UR-MCNC: NEGATIVE
CREATININE, URINE: 136 MG/DL
METHADONE UR-MCNC: NEGATIVE
METHAQUALONE UR-MCNC: NEGATIVE
OPIATES UR-MCNC: NEGATIVE
PCP UR-MCNC: NEGATIVE
PROPOXYPH UR QL: NEGATIVE
THC UR QL: NEGATIVE

## 2018-06-25 NOTE — HISTORY OF PRESENT ILLNESS
[FreeTextEntry1] : Patient here for 1 month follow up  [de-identified] : patient here for follow up and medication discussion\par he is still taking fluoxetine and gabapentin\par \par tates his mood is slighlty better with medication but not at goal\par \par he recently saw urology and restarted testosterone

## 2018-07-06 ENCOUNTER — APPOINTMENT (OUTPATIENT)
Dept: PAIN MANAGEMENT | Facility: CLINIC | Age: 38
End: 2018-07-06

## 2018-07-09 ENCOUNTER — RX RENEWAL (OUTPATIENT)
Age: 38
End: 2018-07-09

## 2018-07-11 ENCOUNTER — OUTPATIENT (OUTPATIENT)
Dept: OUTPATIENT SERVICES | Facility: HOSPITAL | Age: 38
LOS: 1 days | End: 2018-07-11
Payer: COMMERCIAL

## 2018-07-11 ENCOUNTER — APPOINTMENT (OUTPATIENT)
Dept: MRI IMAGING | Facility: CLINIC | Age: 38
End: 2018-07-11
Payer: COMMERCIAL

## 2018-07-11 DIAGNOSIS — Z00.8 ENCOUNTER FOR OTHER GENERAL EXAMINATION: ICD-10-CM

## 2018-07-11 PROCEDURE — 70553 MRI BRAIN STEM W/O & W/DYE: CPT

## 2018-07-11 PROCEDURE — 70553 MRI BRAIN STEM W/O & W/DYE: CPT | Mod: 26

## 2018-07-19 ENCOUNTER — APPOINTMENT (OUTPATIENT)
Dept: FAMILY MEDICINE | Facility: CLINIC | Age: 38
End: 2018-07-19
Payer: COMMERCIAL

## 2018-07-19 VITALS
SYSTOLIC BLOOD PRESSURE: 138 MMHG | WEIGHT: 190 LBS | DIASTOLIC BLOOD PRESSURE: 80 MMHG | BODY MASS INDEX: 27.2 KG/M2 | OXYGEN SATURATION: 97 % | HEIGHT: 70 IN | HEART RATE: 95 BPM

## 2018-07-19 DIAGNOSIS — Z81.8 FAMILY HISTORY OF OTHER MENTAL AND BEHAVIORAL DISORDERS: ICD-10-CM

## 2018-07-19 DIAGNOSIS — Z82.61 FAMILY HISTORY OF ARTHRITIS: ICD-10-CM

## 2018-07-19 DIAGNOSIS — Z83.1 FAMILY HISTORY OF OTHER INFECTIOUS AND PARASITIC DISEASES: ICD-10-CM

## 2018-07-19 DIAGNOSIS — Z83.79 FAMILY HISTORY OF OTHER DISEASES OF THE DIGESTIVE SYSTEM: ICD-10-CM

## 2018-07-19 PROCEDURE — 99214 OFFICE O/P EST MOD 30 MIN: CPT

## 2018-07-20 ENCOUNTER — RX RENEWAL (OUTPATIENT)
Age: 38
End: 2018-07-20

## 2018-07-25 PROBLEM — Z80.8 FAMILY HISTORY OF SKIN CANCER: Status: ACTIVE | Noted: 2017-11-20

## 2018-08-20 ENCOUNTER — APPOINTMENT (OUTPATIENT)
Dept: FAMILY MEDICINE | Facility: CLINIC | Age: 38
End: 2018-08-20
Payer: COMMERCIAL

## 2018-08-20 VITALS
SYSTOLIC BLOOD PRESSURE: 138 MMHG | BODY MASS INDEX: 27.92 KG/M2 | HEART RATE: 93 BPM | HEIGHT: 70 IN | DIASTOLIC BLOOD PRESSURE: 72 MMHG | WEIGHT: 195 LBS | OXYGEN SATURATION: 98 %

## 2018-08-20 PROCEDURE — 99214 OFFICE O/P EST MOD 30 MIN: CPT

## 2018-08-22 NOTE — HISTORY OF PRESENT ILLNESS
[FreeTextEntry1] : Patient here for follow up on Anxiety and Depression  [de-identified] : patient is here for one month recheck\par he states he is still taking the fluoxetine and gabapentin  " most of the time"\par he was looking into MAOI like medications that he found on internet but knows that he cant combine ssri with MOAI\par he is still going to work but continues to admit to chronic pain and depression\par he denies any active suicidal ideations\par he is still taking vitamin d

## 2018-08-22 NOTE — ASSESSMENT
[FreeTextEntry1] : patient agreeable to staying on fluoxetine for now and holoding off on idea of self treating with meds obtained via internet\par he is advised to see derm\par and to follow up in my office for one month

## 2018-08-28 NOTE — REVIEW OF SYSTEMS
[Joint Pain] : joint pain [Muscle Pain] : muscle pain [Depression] : depression [Negative] : Heme/Lymph [Suicidal] : not suicidal

## 2018-08-28 NOTE — HISTORY OF PRESENT ILLNESS
[FreeTextEntry1] : Patient here for follow up on Anxiety  [de-identified] : 38 year old male here for one month follow up\par patient is doing about the same\par he states he is taking the fluoxetine\par still depressed due to chronic pain but going to work\par no new complaints

## 2018-09-04 ENCOUNTER — OTHER (OUTPATIENT)
Age: 38
End: 2018-09-04

## 2018-09-11 ENCOUNTER — MEDICATION RENEWAL (OUTPATIENT)
Age: 38
End: 2018-09-11

## 2018-10-01 ENCOUNTER — LABORATORY RESULT (OUTPATIENT)
Age: 38
End: 2018-10-01

## 2018-10-01 ENCOUNTER — APPOINTMENT (OUTPATIENT)
Dept: FAMILY MEDICINE | Facility: CLINIC | Age: 38
End: 2018-10-01
Payer: COMMERCIAL

## 2018-10-01 VITALS
HEART RATE: 99 BPM | HEIGHT: 70 IN | SYSTOLIC BLOOD PRESSURE: 126 MMHG | OXYGEN SATURATION: 95 % | WEIGHT: 188 LBS | DIASTOLIC BLOOD PRESSURE: 70 MMHG | BODY MASS INDEX: 26.92 KG/M2

## 2018-10-01 DIAGNOSIS — E55.9 VITAMIN D DEFICIENCY, UNSPECIFIED: ICD-10-CM

## 2018-10-01 PROCEDURE — 36415 COLL VENOUS BLD VENIPUNCTURE: CPT

## 2018-10-01 PROCEDURE — 99214 OFFICE O/P EST MOD 30 MIN: CPT | Mod: 25

## 2018-10-01 RX ORDER — FLUOXETINE HYDROCHLORIDE 40 MG/1
40 CAPSULE ORAL
Qty: 90 | Refills: 0 | Status: DISCONTINUED | COMMUNITY
Start: 2018-04-27 | End: 2018-10-01

## 2018-10-01 NOTE — HISTORY OF PRESENT ILLNESS
[FreeTextEntry1] : follow up on anxiety/depression  [de-identified] : patient yanelis erwin for follow up with his mother\par per patient he stopped taking fluoxetine due to side effects\par he states he will occasionally take gabapentin\par he had labs done with endo over the summer\par his wife and mother are concerned about hormone levels\par he is still receiving testosterone injections with dr webster\par continue to complain of chronic pain\par wife did some research and is inquiring about use of tegretol for pain\par patient still ahs not made appointment to see psychiatrist and no-showed last pain/neuro visit)

## 2018-10-01 NOTE — REVIEW OF SYSTEMS
[Fatigue] : fatigue [Joint Pain] : joint pain [Muscle Pain] : muscle pain [Suicidal] : not suicidal [Depression] : depression

## 2018-10-01 NOTE — ASSESSMENT
[FreeTextEntry1] : encouraged patient to make follow up appointkent with Dr. Hutchins to consider tegretol for treatment\par also encourage patient to find psychiatrist\par \par also needs appointmetn with derm to diagnose and treat rash (now had for over three months)

## 2018-10-08 ENCOUNTER — APPOINTMENT (OUTPATIENT)
Dept: PAIN MANAGEMENT | Facility: CLINIC | Age: 38
End: 2018-10-08
Payer: COMMERCIAL

## 2018-10-08 VITALS
HEART RATE: 88 BPM | WEIGHT: 188 LBS | HEIGHT: 70 IN | BODY MASS INDEX: 26.92 KG/M2 | SYSTOLIC BLOOD PRESSURE: 125 MMHG | DIASTOLIC BLOOD PRESSURE: 76 MMHG

## 2018-10-08 PROCEDURE — 99213 OFFICE O/P EST LOW 20 MIN: CPT

## 2018-11-26 LAB
25(OH)D3 SERPL-MCNC: 33.6 NG/ML
ALBUMIN SERPL ELPH-MCNC: 4.8 G/DL
ALP BLD-CCNC: 88 U/L
ALT SERPL-CCNC: 16 U/L
ANA SER IF-ACNC: NEGATIVE
ANION GAP SERPL CALC-SCNC: 15 MMOL/L
AST SERPL-CCNC: 24 U/L
B BURGDOR IGG+IGM SER QL IB: NORMAL
BASOPHILS # BLD AUTO: 0.05 K/UL
BASOPHILS NFR BLD AUTO: 0.4 %
BILIRUB SERPL-MCNC: 0.3 MG/DL
BUN SERPL-MCNC: 12 MG/DL
CALCIUM SERPL-MCNC: 8.7 MG/DL
CHLORIDE SERPL-SCNC: 98 MMOL/L
CO2 SERPL-SCNC: 27 MMOL/L
CREAT SERPL-MCNC: 1.33 MG/DL
CRP SERPL-MCNC: 0.34 MG/DL
EOSINOPHIL # BLD AUTO: 0.21 K/UL
EOSINOPHIL NFR BLD AUTO: 1.6 %
ERYTHROCYTE [SEDIMENTATION RATE] IN BLOOD BY WESTERGREN METHOD: 4 MM/HR
GLUCOSE SERPL-MCNC: 86 MG/DL
HCT VFR BLD CALC: 50.2 %
HGB BLD-MCNC: 16.6 G/DL
IMM GRANULOCYTES NFR BLD AUTO: 0.3 %
LYMPHOCYTES # BLD AUTO: 2.64 K/UL
LYMPHOCYTES NFR BLD AUTO: 19.8 %
MAN DIFF?: NORMAL
MCHC RBC-ENTMCNC: 30.4 PG
MCHC RBC-ENTMCNC: 33.1 GM/DL
MCV RBC AUTO: 91.9 FL
MONOCYTES # BLD AUTO: 1.1 K/UL
MONOCYTES NFR BLD AUTO: 8.3 %
NEUTROPHILS # BLD AUTO: 9.27 K/UL
NEUTROPHILS NFR BLD AUTO: 69.6 %
PLATELET # BLD AUTO: 325 K/UL
POTASSIUM SERPL-SCNC: 4.4 MMOL/L
PROLACTIN SERPL-MCNC: 23.4 NG/ML
PROT SERPL-MCNC: 7 G/DL
RBC # BLD: 5.46 M/UL
RBC # FLD: 13.8 %
RHEUMATOID FACT SER QL: <10 IU/ML
SODIUM SERPL-SCNC: 140 MMOL/L
TESTOST BND SERPL-MCNC: 16.1 PG/ML
TESTOST SERPL-MCNC: 577.3 NG/DL
TSH SERPL-ACNC: 4.39 UIU/ML
WBC # FLD AUTO: 13.31 K/UL

## 2018-12-03 ENCOUNTER — RX RENEWAL (OUTPATIENT)
Age: 38
End: 2018-12-03

## 2018-12-05 ENCOUNTER — RX RENEWAL (OUTPATIENT)
Age: 38
End: 2018-12-05

## 2018-12-11 ENCOUNTER — APPOINTMENT (OUTPATIENT)
Dept: PAIN MANAGEMENT | Facility: CLINIC | Age: 38
End: 2018-12-11
Payer: COMMERCIAL

## 2018-12-11 VITALS
HEART RATE: 96 BPM | BODY MASS INDEX: 27.2 KG/M2 | DIASTOLIC BLOOD PRESSURE: 75 MMHG | SYSTOLIC BLOOD PRESSURE: 143 MMHG | HEIGHT: 70 IN | WEIGHT: 190 LBS

## 2018-12-11 PROCEDURE — 99213 OFFICE O/P EST LOW 20 MIN: CPT

## 2018-12-18 ENCOUNTER — APPOINTMENT (OUTPATIENT)
Dept: ENDOCRINOLOGY | Facility: CLINIC | Age: 38
End: 2018-12-18
Payer: COMMERCIAL

## 2018-12-18 VITALS
BODY MASS INDEX: 27.06 KG/M2 | SYSTOLIC BLOOD PRESSURE: 142 MMHG | HEART RATE: 90 BPM | DIASTOLIC BLOOD PRESSURE: 70 MMHG | HEIGHT: 70 IN | WEIGHT: 189 LBS

## 2018-12-18 DIAGNOSIS — F17.200 NICOTINE DEPENDENCE, UNSPECIFIED, UNCOMPLICATED: ICD-10-CM

## 2018-12-18 DIAGNOSIS — E23.6 OTHER DISORDERS OF PITUITARY GLAND: ICD-10-CM

## 2018-12-18 DIAGNOSIS — D35.2 BENIGN NEOPLASM OF PITUITARY GLAND: ICD-10-CM

## 2018-12-18 DIAGNOSIS — R94.6 ABNORMAL RESULTS OF THYROID FUNCTION STUDIES: ICD-10-CM

## 2018-12-18 DIAGNOSIS — E22.1 HYPERPROLACTINEMIA: ICD-10-CM

## 2018-12-18 PROCEDURE — 99406 BEHAV CHNG SMOKING 3-10 MIN: CPT

## 2018-12-18 PROCEDURE — 99214 OFFICE O/P EST MOD 30 MIN: CPT | Mod: 25

## 2018-12-19 LAB
ACTH SER-ACNC: 10 PG/ML
ALBUMIN SERPL ELPH-MCNC: 5.2 G/DL
ALP BLD-CCNC: 84 U/L
ALT SERPL-CCNC: 18 U/L
ANION GAP SERPL CALC-SCNC: 11 MMOL/L
AST SERPL-CCNC: 22 U/L
BASOPHILS # BLD AUTO: 0.04 K/UL
BASOPHILS NFR BLD AUTO: 0.5 %
BILIRUB SERPL-MCNC: 0.3 MG/DL
BUN SERPL-MCNC: 11 MG/DL
CALCIUM SERPL-MCNC: 9.4 MG/DL
CHLORIDE SERPL-SCNC: 101 MMOL/L
CO2 SERPL-SCNC: 30 MMOL/L
CORTIS SERPL-MCNC: 11 UG/DL
CREAT SERPL-MCNC: 1.05 MG/DL
EOSINOPHIL # BLD AUTO: 0.18 K/UL
EOSINOPHIL NFR BLD AUTO: 2.3 %
FSH SERPL-MCNC: <0.1 IU/L
GLUCOSE SERPL-MCNC: 72 MG/DL
HCT VFR BLD CALC: 51.3 %
HGB BLD-MCNC: 17.1 G/DL
IGF-1 INTERP: NORMAL
IGF-I BLD-MCNC: 246 NG/ML
IMM GRANULOCYTES NFR BLD AUTO: 0.8 %
LH SERPL-ACNC: <0.1 IU/L
LYMPHOCYTES # BLD AUTO: 1.48 K/UL
LYMPHOCYTES NFR BLD AUTO: 18.5 %
MAN DIFF?: NORMAL
MCHC RBC-ENTMCNC: 30.9 PG
MCHC RBC-ENTMCNC: 33.3 GM/DL
MCV RBC AUTO: 92.6 FL
MONOCYTES # BLD AUTO: 0.74 K/UL
MONOCYTES NFR BLD AUTO: 9.3 %
NEUTROPHILS # BLD AUTO: 5.48 K/UL
NEUTROPHILS NFR BLD AUTO: 68.6 %
PLATELET # BLD AUTO: 305 K/UL
POTASSIUM SERPL-SCNC: 5 MMOL/L
PROT SERPL-MCNC: 7.5 G/DL
RBC # BLD: 5.54 M/UL
RBC # FLD: 14.5 %
SODIUM SERPL-SCNC: 142 MMOL/L
T3 SERPL-MCNC: 131 NG/DL
T4 FREE SERPL-MCNC: 1.2 NG/DL
THYROGLOB AB SERPL-ACNC: <20 IU/ML
THYROGLOB SERPL-MCNC: 22.2 NG/ML
THYROPEROXIDASE AB SERPL IA-ACNC: <10 IU/ML
TSH SERPL-ACNC: 1.26 UIU/ML
WBC # FLD AUTO: 7.98 K/UL

## 2018-12-21 ENCOUNTER — APPOINTMENT (OUTPATIENT)
Dept: FAMILY MEDICINE | Facility: CLINIC | Age: 38
End: 2018-12-21
Payer: COMMERCIAL

## 2018-12-21 VITALS
HEIGHT: 70 IN | HEART RATE: 105 BPM | SYSTOLIC BLOOD PRESSURE: 134 MMHG | BODY MASS INDEX: 26.92 KG/M2 | TEMPERATURE: 98.7 F | DIASTOLIC BLOOD PRESSURE: 76 MMHG | WEIGHT: 188 LBS | OXYGEN SATURATION: 95 %

## 2018-12-21 DIAGNOSIS — R09.82 POSTNASAL DRIP: ICD-10-CM

## 2018-12-21 DIAGNOSIS — J32.9 CHRONIC SINUSITIS, UNSPECIFIED: ICD-10-CM

## 2018-12-21 PROCEDURE — 99214 OFFICE O/P EST MOD 30 MIN: CPT

## 2018-12-21 NOTE — ASSESSMENT
[FreeTextEntry1] : aggressive hydration!!!!!!!\par \par OTC Nasacort AQ  2 sp ea nostril qd \par \par see med orders \par

## 2018-12-21 NOTE — PHYSICAL EXAM
[No Acute Distress] : no acute distress [Well Nourished] : well nourished [Well Developed] : well developed [Well-Appearing] : well-appearing [EOMI] : extraocular movements intact [Normal Rate] : normal rate  [Regular Rhythm] : with a regular rhythm [Normal S1, S2] : normal S1 and S2 [No Edema] : there was no peripheral edema [Non-distended] : non-distended [Normal Posterior Cervical Nodes] : no posterior cervical lymphadenopathy [Normal Anterior Cervical Nodes] : no anterior cervical lymphadenopathy [No CVA Tenderness] : no CVA  tenderness [Grossly Normal Strength/Tone] : grossly normal strength/tone [No Rash] : no rash [Normal Gait] : normal gait [Coordination Grossly Intact] : coordination grossly intact [No Focal Deficits] : no focal deficits [Normal Affect] : the affect was normal [Normal Mood] : the mood was normal [Normal Insight/Judgement] : insight and judgment were intact [de-identified] : +PND, +ve erythema post pharynx, dull TMs B/L  [de-identified] : +nodes  [de-identified] : +diffuse wheezes

## 2018-12-21 NOTE — HISTORY OF PRESENT ILLNESS
[FreeTextEntry1] : patient here for sinus infection   [de-identified] : 1 week hx of sinus congestion, +nasal congestion c green rhinorrhea,  mild sore throat, +dysphagia,  +B/L ear pressure and frontal sinus pressure  no N/V/D no abd pain +decreased appetite \par no rashes\par \par reports mild relief c OTC Saline NS \par \par +smoker 1 ppd x 20 yrs \par \par pt states has taken amoxil in past numerous times with no problem

## 2019-01-03 ENCOUNTER — APPOINTMENT (OUTPATIENT)
Dept: FAMILY MEDICINE | Facility: CLINIC | Age: 39
End: 2019-01-03
Payer: COMMERCIAL

## 2019-01-03 VITALS
OXYGEN SATURATION: 97 % | HEART RATE: 91 BPM | WEIGHT: 188 LBS | SYSTOLIC BLOOD PRESSURE: 136 MMHG | DIASTOLIC BLOOD PRESSURE: 78 MMHG | HEIGHT: 70 IN | BODY MASS INDEX: 26.92 KG/M2

## 2019-01-03 DIAGNOSIS — E29.1 TESTICULAR HYPOFUNCTION: ICD-10-CM

## 2019-01-03 DIAGNOSIS — F32.9 MAJOR DEPRESSIVE DISORDER, SINGLE EPISODE, UNSPECIFIED: ICD-10-CM

## 2019-01-03 PROCEDURE — 36415 COLL VENOUS BLD VENIPUNCTURE: CPT

## 2019-01-03 PROCEDURE — 99214 OFFICE O/P EST MOD 30 MIN: CPT | Mod: 25

## 2019-01-03 NOTE — ASSESSMENT
[FreeTextEntry1] : urged patient to take tegretol AS DIRECTED\par advised patient to speak with Dr Hutchins about XR tegretol\par level\par \par discussed elevated hemaglobin level \par patient adivsed NOT to increase dose of testosterone\par

## 2019-01-04 LAB
ALBUMIN SERPL ELPH-MCNC: 4.7 G/DL
ALP BLD-CCNC: 73 U/L
ALT SERPL-CCNC: 18 U/L
ANION GAP SERPL CALC-SCNC: 13 MMOL/L
AST SERPL-CCNC: 19 U/L
BASOPHILS # BLD AUTO: 0.04 K/UL
BASOPHILS NFR BLD AUTO: 0.4 %
BILIRUB SERPL-MCNC: 0.2 MG/DL
BUN SERPL-MCNC: 13 MG/DL
CALCIUM SERPL-MCNC: 9.6 MG/DL
CHLORIDE SERPL-SCNC: 102 MMOL/L
CO2 SERPL-SCNC: 25 MMOL/L
CREAT SERPL-MCNC: 1.09 MG/DL
EOSINOPHIL # BLD AUTO: 0.14 K/UL
EOSINOPHIL NFR BLD AUTO: 1.5 %
GLUCOSE SERPL-MCNC: 108 MG/DL
HCT VFR BLD CALC: 48.7 %
HGB BLD-MCNC: 16.3 G/DL
IMM GRANULOCYTES NFR BLD AUTO: 0.2 %
LYMPHOCYTES # BLD AUTO: 2.19 K/UL
LYMPHOCYTES NFR BLD AUTO: 23.5 %
MAN DIFF?: NORMAL
MCHC RBC-ENTMCNC: 30.9 PG
MCHC RBC-ENTMCNC: 33.5 GM/DL
MCV RBC AUTO: 92.2 FL
MONOCYTES # BLD AUTO: 0.75 K/UL
MONOCYTES NFR BLD AUTO: 8.1 %
NEUTROPHILS # BLD AUTO: 6.17 K/UL
NEUTROPHILS NFR BLD AUTO: 66.3 %
PLATELET # BLD AUTO: 327 K/UL
POTASSIUM SERPL-SCNC: 4 MMOL/L
PROT SERPL-MCNC: 6.8 G/DL
RBC # BLD: 5.28 M/UL
RBC # FLD: 13.5 %
SODIUM SERPL-SCNC: 140 MMOL/L
WBC # FLD AUTO: 9.31 K/UL

## 2019-01-10 ENCOUNTER — MEDICATION RENEWAL (OUTPATIENT)
Age: 39
End: 2019-01-10

## 2019-01-10 DIAGNOSIS — Z20.828 CONTACT WITH AND (SUSPECTED) EXPOSURE TO OTHER VIRAL COMMUNICABLE DISEASES: ICD-10-CM

## 2019-01-10 LAB
MONOMERIC PROLACTIN (ICMA)*: 27 NG/ML
PERCENT MACROPROLACTIN: 16 %
PROLACTIN, SERUM (ICMA)*: 32 NG/ML
SHBG SERPL-SCNC: 21 NMOL/L
TESTOST BND SERPL-MCNC: 37.3 PG/ML
TESTOST SERPL-MCNC: 1276.5 NG/DL

## 2019-01-17 ENCOUNTER — RX RENEWAL (OUTPATIENT)
Age: 39
End: 2019-01-17

## 2019-02-05 ENCOUNTER — APPOINTMENT (OUTPATIENT)
Dept: PAIN MANAGEMENT | Facility: CLINIC | Age: 39
End: 2019-02-05
Payer: COMMERCIAL

## 2019-02-05 VITALS
DIASTOLIC BLOOD PRESSURE: 75 MMHG | BODY MASS INDEX: 27.2 KG/M2 | HEART RATE: 105 BPM | WEIGHT: 190 LBS | HEIGHT: 70 IN | SYSTOLIC BLOOD PRESSURE: 133 MMHG

## 2019-02-05 DIAGNOSIS — M25.50 PAIN IN UNSPECIFIED JOINT: ICD-10-CM

## 2019-02-05 PROCEDURE — 99213 OFFICE O/P EST LOW 20 MIN: CPT

## 2019-02-06 PROBLEM — M25.50 JOINT PAIN: Status: ACTIVE | Noted: 2018-06-19

## 2019-02-06 NOTE — HISTORY OF PRESENT ILLNESS
[FreeTextEntry1] : The patient presented today for a follow up ov.  He states that his pain is doing somewhat better.  He is sleeping longer at night since the tegretol was added.  He reports consistently taking his medications.  We discussed medical marijuanna today but he is not interested.  He states that in the past he used marijuanna off the street and it had no benefit.  I described the differences between medical marijuanna and illicit marijuanna but he still was not interested.  VAS=5-7/10 on average.  He describes his pain as achy and burning.  \par \par H/O concussions which resulted in LOC, stable now.  He reports headaches 3-4 times per year now. \par \par Interval history- H/O anxiety, depression, and is on the spectrum of autism. He was on opioids previously for years and then subsequently on Suboxone for 5 years.The suboxone was discontinued due to low testosterone levels. He has also tried Lyrica in the past. Gabapentin and PT no relief. \par \par He is presently working for the department of [employer] but doesn't drive. His wife is a nurse. 2 kids, ages 15 and 5.\par \par He denies feeling depressed and suicidal ideations at the present time. He stopped prozac due to no relief.  He is not presently seeing a psychologist/psychiatrist. \par \par He denies other medical issues. \par

## 2019-02-06 NOTE — ASSESSMENT
[FreeTextEntry1] : No signs of toxicity. Will continue to monitor.  Change tegretol ER to 200 mgs.  He may take neurontin 100 mgs bid in addition.  Explained to the patient that he must take the medication regularly for it to work.  Also explained to the patient that his dosage must be tapered up. \par \par Discussed the importance of exercise and psychological care.\par \par Follow up for reassessment in 2 months or sooner if clinically indicated.

## 2019-02-06 NOTE — PHYSICAL EXAM
[General Appearance - Alert] : alert [Oriented To Time, Place, And Person] : oriented to person, place, and time [Affect] : the affect was normal [Person] : oriented to person [Place] : oriented to place [Time] : oriented to time [Short Term Intact] : short term memory intact [Registration Intact] : recent registration memory intact [Concentration Intact] : normal concentrating ability [Visual Intact] : visual attention was ~T not ~L decreased [Fluency] : fluency intact [Comprehension] : comprehension intact [Past History] : adequate knowledge of personal past history [Cranial Nerves Oculomotor (III)] : extraocular motion intact [Cranial Nerves Trigeminal (V)] : facial sensation intact symmetrically [Cranial Nerves Facial (VII)] : face symmetrical [Motor Tone] : muscle tone was normal in all four extremities [Motor Strength] : muscle strength was normal in all four extremities [Sensation Tactile Decrease] : light touch was intact [Abnormal Walk] : normal gait [Balance] : balance was intact [Sclera] : the sclera and conjunctiva were normal [Outer Ear] : the ears and nose were normal in appearance [Neck Appearance] : the appearance of the neck was normal [] : no rash

## 2019-02-19 ENCOUNTER — RX RENEWAL (OUTPATIENT)
Age: 39
End: 2019-02-19

## 2019-03-03 ENCOUNTER — RX RENEWAL (OUTPATIENT)
Age: 39
End: 2019-03-03

## 2019-03-04 ENCOUNTER — RX RENEWAL (OUTPATIENT)
Age: 39
End: 2019-03-04

## 2019-03-21 ENCOUNTER — APPOINTMENT (OUTPATIENT)
Dept: DERMATOLOGY | Facility: CLINIC | Age: 39
End: 2019-03-21
Payer: COMMERCIAL

## 2019-03-21 VITALS — HEIGHT: 70 IN | BODY MASS INDEX: 27.2 KG/M2 | WEIGHT: 190 LBS

## 2019-03-21 DIAGNOSIS — Z80.8 FAMILY HISTORY OF MALIGNANT NEOPLASM OF OTHER ORGANS OR SYSTEMS: ICD-10-CM

## 2019-03-21 DIAGNOSIS — Z77.22 CONTACT WITH AND (SUSPECTED) EXPOSURE TO ENVIRONMENTAL TOBACCO SMOKE (ACUTE) (CHRONIC): ICD-10-CM

## 2019-03-21 DIAGNOSIS — D23.9 OTHER BENIGN NEOPLASM OF SKIN, UNSPECIFIED: ICD-10-CM

## 2019-03-21 DIAGNOSIS — D22.9 MELANOCYTIC NEVI, UNSPECIFIED: ICD-10-CM

## 2019-03-21 DIAGNOSIS — Z78.9 OTHER SPECIFIED HEALTH STATUS: ICD-10-CM

## 2019-03-21 DIAGNOSIS — M19.90 UNSPECIFIED OSTEOARTHRITIS, UNSPECIFIED SITE: ICD-10-CM

## 2019-03-21 DIAGNOSIS — L85.8 OTHER SPECIFIED EPIDERMAL THICKENING: ICD-10-CM

## 2019-03-21 PROCEDURE — 99243 OFF/OP CNSLTJ NEW/EST LOW 30: CPT

## 2019-03-21 NOTE — HISTORY OF PRESENT ILLNESS
[FreeTextEntry1] : NP: rash  [de-identified] : ROHIT LEE is a 38 year M here for evaluation of a few issues:\par 1. Rash on arms and legs present x years, sometimes gets itchy.  Has not tried any treatments.  No assoc signs or symptoms.  No modifying factors.\par 2. Mole on abdomen, was more brown in the past, now more pink.  Has been there x years.  Not grown or bled.\par 3. Bump behind left leg, thinks it may need to be removed.  Not changing.  Not symptomatic.\par \par Referred by JODIE CLOUD\par

## 2019-03-21 NOTE — PHYSICAL EXAM
[Alert] : alert [Oriented x 3] : ~L oriented x 3 [Well Nourished] : well nourished [Conjunctiva Non-injected] : conjunctiva non-injected [No Visual Lymphadenopathy] : no visual  lymphadenopathy [No Clubbing] : no clubbing [No Edema] : no edema [No Bromhidrosis] : no bromhidrosis [No Chromhidrosis] : no chromhidrosis [FreeTextEntry3] : -folliculocentric hyperkeratotic pink papules on upper arms, back, and legs \par -pink papule on abdomen with comma vessels\par -hyperpigmented papulonodule with central sclerosis on left posterior thigh

## 2019-03-28 ENCOUNTER — RX RENEWAL (OUTPATIENT)
Age: 39
End: 2019-03-28

## 2019-03-28 RX ORDER — MONTELUKAST 10 MG/1
10 TABLET, FILM COATED ORAL
Qty: 30 | Refills: 0 | Status: ACTIVE | COMMUNITY
Start: 2018-12-21 | End: 1900-01-01

## 2019-04-02 ENCOUNTER — APPOINTMENT (OUTPATIENT)
Dept: PAIN MANAGEMENT | Facility: CLINIC | Age: 39
End: 2019-04-02
Payer: COMMERCIAL

## 2019-04-02 VITALS
SYSTOLIC BLOOD PRESSURE: 140 MMHG | BODY MASS INDEX: 26.34 KG/M2 | HEART RATE: 99 BPM | DIASTOLIC BLOOD PRESSURE: 86 MMHG | HEIGHT: 70 IN | WEIGHT: 184 LBS

## 2019-04-02 DIAGNOSIS — R69 ILLNESS, UNSPECIFIED: ICD-10-CM

## 2019-04-02 DIAGNOSIS — M79.7 FIBROMYALGIA: ICD-10-CM

## 2019-04-02 DIAGNOSIS — R52 PAIN, UNSPECIFIED: ICD-10-CM

## 2019-04-02 PROCEDURE — 99213 OFFICE O/P EST LOW 20 MIN: CPT

## 2019-04-02 RX ORDER — CARBAMAZEPINE 200 MG/1
200 TABLET, EXTENDED RELEASE ORAL
Qty: 30 | Refills: 1 | Status: ACTIVE | COMMUNITY
Start: 2019-02-05 | End: 1900-01-01

## 2019-04-02 RX ORDER — GABAPENTIN 100 MG/1
100 CAPSULE ORAL
Qty: 60 | Refills: 2 | Status: DISCONTINUED | COMMUNITY
Start: 2018-04-27 | End: 2019-04-02

## 2019-04-02 RX ORDER — CARBAMAZEPINE 100 MG/1
100 TABLET, CHEWABLE ORAL
Qty: 60 | Refills: 1 | Status: DISCONTINUED | COMMUNITY
Start: 2018-10-08 | End: 2019-04-02

## 2019-04-04 ENCOUNTER — APPOINTMENT (OUTPATIENT)
Dept: FAMILY MEDICINE | Facility: CLINIC | Age: 39
End: 2019-04-04
Payer: COMMERCIAL

## 2019-04-04 VITALS
BODY MASS INDEX: 26.2 KG/M2 | HEIGHT: 70 IN | SYSTOLIC BLOOD PRESSURE: 136 MMHG | DIASTOLIC BLOOD PRESSURE: 78 MMHG | HEART RATE: 92 BPM | WEIGHT: 183 LBS | OXYGEN SATURATION: 98 %

## 2019-04-04 DIAGNOSIS — G89.29 OTHER CHRONIC PAIN: ICD-10-CM

## 2019-04-04 PROCEDURE — 36415 COLL VENOUS BLD VENIPUNCTURE: CPT

## 2019-04-04 PROCEDURE — 99213 OFFICE O/P EST LOW 20 MIN: CPT | Mod: 25

## 2019-04-04 RX ORDER — AMOXICILLIN 875 MG/1
875 TABLET, FILM COATED ORAL
Qty: 20 | Refills: 0 | Status: DISCONTINUED | COMMUNITY
Start: 2018-12-21 | End: 2019-04-04

## 2019-04-05 PROBLEM — G89.29 CHRONIC PAIN: Status: ACTIVE | Noted: 2019-04-05

## 2019-04-05 NOTE — ASSESSMENT
[FreeTextEntry1] : No signs of toxicity. Will continue to monitor.  Renew tegretol ER to 200 mgs.  Discontinue neurontin 100 mgs bid.  Instructions given to wean off.  Labs ordered, including a tegretol level.  Explained to the patient that he must take the Tegretol regularly for it to work.  Also explained to the patient that his dosage may need to be tapered up.  \par \par Medical Marijuanna certification provided.  Patient educated regarding the risks and benefits of medical marijuanna. \par \par Discussed the importance of exercise and psychological care.\par \par Follow up for reassessment in 2 months or sooner if clinically indicated.

## 2019-04-05 NOTE — HISTORY OF PRESENT ILLNESS
[FreeTextEntry1] : The patient returned today for a follow up ov.  He states that he has not experienced any relief with the tegretol or gabapentin.  He reports taking it regularly, as prescribed but does not note any benefit. We discussed medical marijuanna again today.  He is now open to trying the CBD component only due to his job.  Discussed the differences between the CBD oil in the "Vap" shops etc.and the medical dispensary.  VAS=6-7/10 on average.  He describes his pain as achy and burning.  He has not had recent labs. \par \par H/O concussions which resulted in LOC, stable now.  He reports headaches approximately 3-4 times per year. \par \par Interval history- H/O anxiety, depression, and is on the spectrum of autism. He was on opioids previously for years and then subsequently on Suboxone for 5 years.The suboxone was discontinued due to low testosterone levels. He has also tried Lyrica, Gabapentin and PT in the past- no relief. \par \par He is presently working for the department of [employer] but doesn't drive. He lives with his wife and 2 kids-ages 15 and 5.\par \par He denies feeling depressed and suicidal ideations at the present time. He stopped prozac due to no relief.  He is not presently seeing a psychologist/psychiatrist. \par \par He denies other medical complaints.

## 2019-04-11 ENCOUNTER — OTHER (OUTPATIENT)
Age: 39
End: 2019-04-11

## 2019-04-19 LAB
25(OH)D3 SERPL-MCNC: 25.4 NG/ML
ALBUMIN SERPL ELPH-MCNC: 5.3 G/DL
ALP BLD-CCNC: 79 U/L
ALT SERPL-CCNC: 17 U/L
ANION GAP SERPL CALC-SCNC: 14 MMOL/L
AST SERPL-CCNC: 24 U/L
BASOPHILS # BLD AUTO: 0.06 K/UL
BASOPHILS NFR BLD AUTO: 0.6 %
BILIRUB SERPL-MCNC: 0.4 MG/DL
BUN SERPL-MCNC: 16 MG/DL
CALCIUM SERPL-MCNC: 9.6 MG/DL
CHLORIDE SERPL-SCNC: 97 MMOL/L
CHOLEST SERPL-MCNC: 185 MG/DL
CHOLEST/HDLC SERPL: 3.3 RATIO
CO2 SERPL-SCNC: 28 MMOL/L
CREAT SERPL-MCNC: 1.22 MG/DL
EOSINOPHIL # BLD AUTO: 0.05 K/UL
EOSINOPHIL NFR BLD AUTO: 0.5 %
GLUCOSE SERPL-MCNC: 102 MG/DL
HCT VFR BLD CALC: 52 %
HDLC SERPL-MCNC: 56 MG/DL
HGB BLD-MCNC: 17.5 G/DL
IMM GRANULOCYTES NFR BLD AUTO: 0.3 %
LDLC SERPL CALC-MCNC: 111 MG/DL
LYMPHOCYTES # BLD AUTO: 1.91 K/UL
LYMPHOCYTES NFR BLD AUTO: 20.6 %
MAN DIFF?: NORMAL
MCHC RBC-ENTMCNC: 30.4 PG
MCHC RBC-ENTMCNC: 33.7 GM/DL
MCV RBC AUTO: 90.3 FL
MONOCYTES # BLD AUTO: 0.79 K/UL
MONOCYTES NFR BLD AUTO: 8.5 %
NEUTROPHILS # BLD AUTO: 6.45 K/UL
NEUTROPHILS NFR BLD AUTO: 69.5 %
PLATELET # BLD AUTO: 321 K/UL
POTASSIUM SERPL-SCNC: 4.3 MMOL/L
PROT SERPL-MCNC: 7.5 G/DL
RBC # BLD: 5.76 M/UL
RBC # FLD: 12.5 %
SODIUM SERPL-SCNC: 139 MMOL/L
TESTOST BND SERPL-MCNC: 16.9 PG/ML
TESTOST SERPL-MCNC: 583.7 NG/DL
TRIGL SERPL-MCNC: 88 MG/DL
TSH SERPL-ACNC: 1.73 UIU/ML
WBC # FLD AUTO: 9.29 K/UL

## 2019-04-24 ENCOUNTER — MEDICATION RENEWAL (OUTPATIENT)
Age: 39
End: 2019-04-24

## 2019-04-24 RX ORDER — ERGOCALCIFEROL 1.25 MG/1
1.25 MG CAPSULE, LIQUID FILLED ORAL
Qty: 12 | Refills: 0 | Status: ACTIVE | COMMUNITY
Start: 2017-01-02 | End: 1900-01-01

## 2019-04-26 NOTE — PHYSICAL EXAM
[General Appearance - Alert] : alert [Oriented To Time, Place, And Person] : oriented to person, place, and time [Affect] : the affect was normal [Person] : oriented to person [Place] : oriented to place [Time] : oriented to time [Short Term Intact] : short term memory intact [Registration Intact] : recent registration memory intact [Concentration Intact] : normal concentrating ability [Visual Intact] : visual attention was ~T not ~L decreased [Fluency] : fluency intact [Comprehension] : comprehension intact [Past History] : adequate knowledge of personal past history [Cranial Nerves Oculomotor (III)] : extraocular motion intact [Cranial Nerves Trigeminal (V)] : facial sensation intact symmetrically [Cranial Nerves Facial (VII)] : face symmetrical [Motor Tone] : muscle tone was normal in all four extremities [Motor Strength] : muscle strength was normal in all four extremities [Sensation Tactile Decrease] : light touch was intact [Abnormal Walk] : normal gait [Balance] : balance was intact [Sclera] : the sclera and conjunctiva were normal [Outer Ear] : the ears and nose were normal in appearance [Neck Appearance] : the appearance of the neck was normal [] : no rash [No Acute Distress] : no acute distress [Well Nourished] : well nourished [Well Developed] : well developed [Well-Appearing] : well-appearing [PERRL] : pupils equal round and reactive to light [Normal Sclera/Conjunctiva] : normal sclera/conjunctiva [EOMI] : extraocular movements intact [Normal Outer Ear/Nose] : the outer ears and nose were normal in appearance [Supple] : supple [Normal Oropharynx] : the oropharynx was normal [No JVD] : no jugular venous distention [Thyroid Normal, No Nodules] : the thyroid was normal and there were no nodules present [No Lymphadenopathy] : no lymphadenopathy [No Accessory Muscle Use] : no accessory muscle use [No Respiratory Distress] : no respiratory distress  [Clear to Auscultation] : lungs were clear to auscultation bilaterally [Normal Rate] : normal rate  [Normal S1, S2] : normal S1 and S2 [Regular Rhythm] : with a regular rhythm [No Carotid Bruits] : no carotid bruits [No Murmur] : no murmur heard [No Abdominal Bruit] : a ~M bruit was not heard ~T in the abdomen [Pedal Pulses Present] : the pedal pulses are present [No Varicosities] : no varicosities [No Edema] : there was no peripheral edema [No Palpable Aorta] : no palpable aorta [No Extremity Clubbing/Cyanosis] : no extremity clubbing/cyanosis [Soft] : abdomen soft [Non-distended] : non-distended [Non Tender] : non-tender [No Masses] : no abdominal mass palpated [No HSM] : no HSM [Normal Anterior Cervical Nodes] : no anterior cervical lymphadenopathy [Normal Bowel Sounds] : normal bowel sounds [Normal Posterior Cervical Nodes] : no posterior cervical lymphadenopathy [No Spinal Tenderness] : no spinal tenderness [No CVA Tenderness] : no CVA  tenderness [No Joint Swelling] : no joint swelling [No Rash] : no rash [Grossly Normal Strength/Tone] : grossly normal strength/tone [Normal Gait] : normal gait [No Focal Deficits] : no focal deficits [Coordination Grossly Intact] : coordination grossly intact [Normal Insight/Judgement] : insight and judgment were intact [Normal Affect] : the affect was normal [Deep Tendon Reflexes (DTR)] : deep tendon reflexes were 2+ and symmetric

## 2019-05-28 ENCOUNTER — APPOINTMENT (OUTPATIENT)
Dept: PAIN MANAGEMENT | Facility: CLINIC | Age: 39
End: 2019-05-28

## 2019-06-11 ENCOUNTER — TRANSCRIPTION ENCOUNTER (OUTPATIENT)
Age: 39
End: 2019-06-11

## 2019-06-12 ENCOUNTER — APPOINTMENT (OUTPATIENT)
Dept: FAMILY MEDICINE | Facility: CLINIC | Age: 39
End: 2019-06-12
Payer: COMMERCIAL

## 2019-06-12 VITALS
BODY MASS INDEX: 26.2 KG/M2 | HEIGHT: 70 IN | OXYGEN SATURATION: 98 % | DIASTOLIC BLOOD PRESSURE: 69 MMHG | RESPIRATION RATE: 14 BRPM | SYSTOLIC BLOOD PRESSURE: 119 MMHG | HEART RATE: 98 BPM | TEMPERATURE: 98.3 F | WEIGHT: 183 LBS

## 2019-06-12 DIAGNOSIS — L03.90 CELLULITIS, UNSPECIFIED: ICD-10-CM
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
## 2019-06-12 PROCEDURE — 36415 COLL VENOUS BLD VENIPUNCTURE: CPT

## 2019-06-12 PROCEDURE — 99214 OFFICE O/P EST MOD 30 MIN: CPT | Mod: 25

## 2019-06-12 RX ORDER — TRIAMCINOLONE ACETONIDE 1 MG/G
0.1 CREAM TOPICAL
Qty: 1 | Refills: 1 | Status: COMPLETED | COMMUNITY
Start: 2019-03-21 | End: 2019-06-12

## 2019-06-12 RX ORDER — DOXYCYCLINE HYCLATE 100 MG/1
100 CAPSULE ORAL
Qty: 14 | Refills: 0 | Status: ACTIVE | COMMUNITY
Start: 2019-06-12 | End: 1900-01-01

## 2019-06-12 RX ORDER — OSELTAMIVIR PHOSPHATE 75 MG/1
75 CAPSULE ORAL
Qty: 10 | Refills: 0 | Status: COMPLETED | COMMUNITY
Start: 2019-01-10 | End: 2019-06-12

## 2019-06-12 NOTE — HISTORY OF PRESENT ILLNESS
[FreeTextEntry8] : TICK  BITE    2  DAYS  AGO   NO PAIN\par \par 38 yo male c 2 day hx of tick bite to L side of chest.  pt states tick looked "flat" when he pulled it out.  Denies  fever no change in chronic chills/sweats \par ?? arthralgias/myalgias \par \par s/p urgent care  eval "12 hrs ago"  rx'ed Doxycycline 100mg bid x 14 days

## 2019-06-12 NOTE — PHYSICAL EXAM
[No Acute Distress] : no acute distress [Well Nourished] : well nourished [Well Developed] : well developed [EOMI] : extraocular movements intact [Normal Outer Ear/Nose] : the outer ears and nose were normal in appearance [Well-Appearing] : well-appearing [No JVD] : no jugular venous distention [No Respiratory Distress] : no respiratory distress  [No Accessory Muscle Use] : no accessory muscle use [Clear to Auscultation] : lungs were clear to auscultation bilaterally [Regular Rhythm] : with a regular rhythm [Normal Rate] : normal rate  [No Edema] : there was no peripheral edema [Normal S1, S2] : normal S1 and S2 [Non-distended] : non-distended [No CVA Tenderness] : no CVA  tenderness [Normal Gait] : normal gait [Grossly Normal Strength/Tone] : grossly normal strength/tone [Coordination Grossly Intact] : coordination grossly intact [No Focal Deficits] : no focal deficits [Normal Mood] : the mood was normal [Normal Affect] : the affect was normal [de-identified] : +mildly indurated erythematous papular lesion L infraclavicular region  no evidence of streaking and/or drainage at this time  [Normal Insight/Judgement] : insight and judgment were intact

## 2019-06-12 NOTE — ASSESSMENT
[FreeTextEntry1] : see lab orders \par \par cont abx course, extend 14 day course to 21 days \par \par UV protection strongly advised during abx course

## 2019-06-13 LAB
ALBUMIN SERPL ELPH-MCNC: 4.7 G/DL
ALP BLD-CCNC: 73 U/L
ALT SERPL-CCNC: 17 U/L
ANION GAP SERPL CALC-SCNC: 13 MMOL/L
AST SERPL-CCNC: 22 U/L
BASOPHILS # BLD AUTO: 0.05 K/UL
BASOPHILS NFR BLD AUTO: 0.7 %
BILIRUB SERPL-MCNC: 0.4 MG/DL
BUN SERPL-MCNC: 12 MG/DL
CALCIUM SERPL-MCNC: 9.3 MG/DL
CHLORIDE SERPL-SCNC: 100 MMOL/L
CO2 SERPL-SCNC: 26 MMOL/L
CREAT SERPL-MCNC: 1.12 MG/DL
EOSINOPHIL # BLD AUTO: 0.03 K/UL
EOSINOPHIL NFR BLD AUTO: 0.4 %
GLUCOSE SERPL-MCNC: 92 MG/DL
HCT VFR BLD CALC: 47.8 %
HGB BLD-MCNC: 15.8 G/DL
IMM GRANULOCYTES NFR BLD AUTO: 0.4 %
LYMPHOCYTES # BLD AUTO: 1.38 K/UL
LYMPHOCYTES NFR BLD AUTO: 19.9 %
MAN DIFF?: NORMAL
MCHC RBC-ENTMCNC: 30.2 PG
MCHC RBC-ENTMCNC: 33.1 GM/DL
MCV RBC AUTO: 91.2 FL
MONOCYTES # BLD AUTO: 0.49 K/UL
MONOCYTES NFR BLD AUTO: 7.1 %
NEUTROPHILS # BLD AUTO: 4.96 K/UL
NEUTROPHILS NFR BLD AUTO: 71.5 %
PLATELET # BLD AUTO: 265 K/UL
POTASSIUM SERPL-SCNC: 4.6 MMOL/L
PROT SERPL-MCNC: 7 G/DL
RBC # BLD: 5.24 M/UL
RBC # FLD: 12.5 %
SODIUM SERPL-SCNC: 139 MMOL/L
WBC # FLD AUTO: 6.94 K/UL

## 2019-06-17 LAB
A PHAGOCYTOPH IGG TITR SER IF: NORMAL TITER
B BURGDOR AB SER QL IA: NEGATIVE
B MICROTI IGG TITR SER: NORMAL TITER
E CHAFFEENSIS IGG TITR SER IF: NORMAL TITER

## 2019-06-24 ENCOUNTER — APPOINTMENT (OUTPATIENT)
Dept: FAMILY MEDICINE | Facility: CLINIC | Age: 39
End: 2019-06-24

## 2019-06-26 ENCOUNTER — EMERGENCY (EMERGENCY)
Facility: HOSPITAL | Age: 39
LOS: 1 days | Discharge: PSYCHIATRIC OR SUBSTANCE USE FACILITY | End: 2019-06-26
Attending: EMERGENCY MEDICINE
Payer: COMMERCIAL

## 2019-06-26 VITALS
OXYGEN SATURATION: 98 % | HEART RATE: 78 BPM | TEMPERATURE: 98 F | DIASTOLIC BLOOD PRESSURE: 83 MMHG | HEIGHT: 70 IN | SYSTOLIC BLOOD PRESSURE: 142 MMHG | RESPIRATION RATE: 18 BRPM | WEIGHT: 184.97 LBS

## 2019-06-26 LAB
ALBUMIN SERPL ELPH-MCNC: 4.6 G/DL — SIGNIFICANT CHANGE UP (ref 3.3–5)
ALP SERPL-CCNC: 67 U/L — SIGNIFICANT CHANGE UP (ref 40–120)
ALT FLD-CCNC: 13 U/L — SIGNIFICANT CHANGE UP (ref 10–45)
ANION GAP SERPL CALC-SCNC: 15 MMOL/L — SIGNIFICANT CHANGE UP (ref 5–17)
APAP SERPL-MCNC: <15 UG/ML — SIGNIFICANT CHANGE UP (ref 10–30)
AST SERPL-CCNC: 12 U/L — SIGNIFICANT CHANGE UP (ref 10–40)
BASOPHILS # BLD AUTO: 0.1 K/UL — SIGNIFICANT CHANGE UP (ref 0–0.2)
BASOPHILS NFR BLD AUTO: 0.6 % — SIGNIFICANT CHANGE UP (ref 0–2)
BILIRUB SERPL-MCNC: 0.3 MG/DL — SIGNIFICANT CHANGE UP (ref 0.2–1.2)
BUN SERPL-MCNC: 10 MG/DL — SIGNIFICANT CHANGE UP (ref 7–23)
CALCIUM SERPL-MCNC: 9 MG/DL — SIGNIFICANT CHANGE UP (ref 8.4–10.5)
CHLORIDE SERPL-SCNC: 102 MMOL/L — SIGNIFICANT CHANGE UP (ref 96–108)
CO2 SERPL-SCNC: 25 MMOL/L — SIGNIFICANT CHANGE UP (ref 22–31)
CREAT SERPL-MCNC: 1.03 MG/DL — SIGNIFICANT CHANGE UP (ref 0.5–1.3)
EOSINOPHIL # BLD AUTO: 0.1 K/UL — SIGNIFICANT CHANGE UP (ref 0–0.5)
EOSINOPHIL NFR BLD AUTO: 1.6 % — SIGNIFICANT CHANGE UP (ref 0–6)
ETHANOL SERPL-MCNC: SIGNIFICANT CHANGE UP MG/DL (ref 0–10)
GLUCOSE SERPL-MCNC: 105 MG/DL — HIGH (ref 70–99)
HCT VFR BLD CALC: 44.9 % — SIGNIFICANT CHANGE UP (ref 39–50)
HGB BLD-MCNC: 15.8 G/DL — SIGNIFICANT CHANGE UP (ref 13–17)
LYMPHOCYTES # BLD AUTO: 2.4 K/UL — SIGNIFICANT CHANGE UP (ref 1–3.3)
LYMPHOCYTES # BLD AUTO: 26.4 % — SIGNIFICANT CHANGE UP (ref 13–44)
MCHC RBC-ENTMCNC: 32.4 PG — SIGNIFICANT CHANGE UP (ref 27–34)
MCHC RBC-ENTMCNC: 35.2 GM/DL — SIGNIFICANT CHANGE UP (ref 32–36)
MCV RBC AUTO: 92.1 FL — SIGNIFICANT CHANGE UP (ref 80–100)
MONOCYTES # BLD AUTO: 0.7 K/UL — SIGNIFICANT CHANGE UP (ref 0–0.9)
MONOCYTES NFR BLD AUTO: 8.1 % — SIGNIFICANT CHANGE UP (ref 2–14)
NEUTROPHILS # BLD AUTO: 5.7 K/UL — SIGNIFICANT CHANGE UP (ref 1.8–7.4)
NEUTROPHILS NFR BLD AUTO: 63.3 % — SIGNIFICANT CHANGE UP (ref 43–77)
PLATELET # BLD AUTO: 273 K/UL — SIGNIFICANT CHANGE UP (ref 150–400)
POTASSIUM SERPL-MCNC: 3.8 MMOL/L — SIGNIFICANT CHANGE UP (ref 3.5–5.3)
POTASSIUM SERPL-SCNC: 3.8 MMOL/L — SIGNIFICANT CHANGE UP (ref 3.5–5.3)
PROT SERPL-MCNC: 6.7 G/DL — SIGNIFICANT CHANGE UP (ref 6–8.3)
RBC # BLD: 4.88 M/UL — SIGNIFICANT CHANGE UP (ref 4.2–5.8)
RBC # FLD: 12.3 % — SIGNIFICANT CHANGE UP (ref 10.3–14.5)
SALICYLATES SERPL-MCNC: <2 MG/DL — LOW (ref 15–30)
SODIUM SERPL-SCNC: 142 MMOL/L — SIGNIFICANT CHANGE UP (ref 135–145)
WBC # BLD: 9.1 K/UL — SIGNIFICANT CHANGE UP (ref 3.8–10.5)
WBC # FLD AUTO: 9.1 K/UL — SIGNIFICANT CHANGE UP (ref 3.8–10.5)

## 2019-06-26 PROCEDURE — 93010 ELECTROCARDIOGRAM REPORT: CPT

## 2019-06-26 PROCEDURE — 99285 EMERGENCY DEPT VISIT HI MDM: CPT | Mod: 25

## 2019-06-26 PROCEDURE — 85027 COMPLETE CBC AUTOMATED: CPT

## 2019-06-26 PROCEDURE — 80053 COMPREHEN METABOLIC PANEL: CPT

## 2019-06-26 PROCEDURE — 90792 PSYCH DIAG EVAL W/MED SRVCS: CPT | Mod: GT

## 2019-06-26 PROCEDURE — 80307 DRUG TEST PRSMV CHEM ANLYZR: CPT

## 2019-06-26 PROCEDURE — 93005 ELECTROCARDIOGRAM TRACING: CPT

## 2019-06-26 PROCEDURE — 99285 EMERGENCY DEPT VISIT HI MDM: CPT

## 2019-06-26 NOTE — ED PROVIDER NOTE - PROGRESS NOTE DETAILS
Medically Cleared for Psych eval Tr:  pt signed out to me.  discussed with telepsych doctor.  pt wants to be admitted and is willing to do so voluntarily.  Pt to be transferred to [facility] for inpatient psych voluntary admission

## 2019-06-26 NOTE — ED ADULT TRIAGE NOTE - BP NONINVASIVE SYSTOLIC (MM HG)
This is a chronic health problem that is well controlled with current medications and lifestyle measures.  Patient continues to follow weight watchers and has kept off the 40lbs she lost in 2015.   142

## 2019-06-26 NOTE — ED PROVIDER NOTE - CLINICAL SUMMARY MEDICAL DECISION MAKING FREE TEXT BOX
39M w/ passive SI, wife concerned for patients safety, will check labs and consult psych 39M w/ passive SI, wife concerned for patients safety, will check labs and consult psych    Britni Wallace MD - Attending Physician: Pt here with depression, passive SI. Concern for pt safety. Med clearance labs, Telepsych

## 2019-06-26 NOTE — ED ADULT NURSE NOTE - CHIEF COMPLAINT QUOTE
pt reports generalized body pain for 1 month, has been on doxy for 1.5 weeks for r/o lyme. pt reports depression and anixety. when asked if he has si/hi he reports no, the person with him states "We thought of a plan that if nothing helps he'll kill himself, i'll kill myself, well all be dead." she also reports that he has been taking a "trial medication from [facility] that in his urine showed benzos which he is allergic to his allergy is that he overdoses because he takes too many"

## 2019-06-26 NOTE — ED ADULT TRIAGE NOTE - CHIEF COMPLAINT QUOTE
pt reports generalized body pain for 1 month, has been on doxy for 1.5 weeks for r/o lyme. pt reports depression and anixety. when asked if he has si/hi he reports no, the person with him states "We thought of a plan that if nothing helps he'll kill himself, i'll kill myself, well all be dead." she also reports that he has been taking a "trial medication from [facility] that in his urine showed benzos which he is allergic to his allergy is that he overdoses because he takes too many"

## 2019-06-26 NOTE — ED ADULT NURSE NOTE - OBJECTIVE STATEMENT
38 y/o m bib wife for psych eval, wants to be admitted, but needs medical clearance for psych, per pt. has chronic back pain > 15 years had been on opiates, benzos, antidepressant, but then he stopped taking them about 5 years ago, also, reported 1 psych admission over 7 years ago @ [facility] for bipolar?, On assessment, pt. is cooperative, denies any s/hi, no a/v hallucinations, no delusions and no IOR. denies any etoh/drgu abuse hx

## 2019-06-26 NOTE — ED PROVIDER NOTE - PSYCHIATRIC, MLM
Alert and oriented to person, place, time/situation. Appears anxious, talks quickly, easily distracted

## 2019-06-26 NOTE — ED PROVIDER NOTE - OBJECTIVE STATEMENT
39M hx of depression/ anxiety, chronic back pain, ?bipolar disorder, presenting to the hospital seeking medical clearance for admission to a psychiatric facility to assist with his depression/anxiety. Wife at bedside is providing collateral 39M hx of depression/ anxiety, chronic back pain, ADD previously diagnosed but now not treated, presenting to the hospital seeking medical clearance for admission to a psychiatric facility to assist with his depression/anxiety. Wife at bedside is providing collateral (Isabella Ellis), pt has been saying things such as 'if there is no helping me, I may as well die' and has recently bought a life insurance plan but has not had any active plans or any history of suicidality in the past. His symptoms correlate with his chronic back pain, that he has been told is fibromyalgia and that he has tried multiple medications for such as gabapentin but at one point was told he had an addiction and started on suboxone. Wife states he recently trialled medication from [place], an unknown medication that has not helped him. He reports that he has tried to purchase ketamine online. Wife states she worries for his saftey if he is sent home. 39M hx of depression/ anxiety, chronic back pain, ADD previously diagnosed but now not treated, presenting to the hospital seeking medical clearance for admission to a psychiatric facility to assist with his depression/anxiety. Wife at bedside is providing collateral (Isabella Ellis), pt has been saying things such as 'if there is no helping me, I may as well die' and has recently bought a life insurance plan but has not had any active plans or any history of suicidality in the past. His symptoms correlate with his chronic back pain, that he has been told is fibromyalgia and that he has tried multiple medications for such as gabapentin but at one point was told he had an addiction and started on suboxone. Wife states he recently trialed medication from [place], an unknown medication that has not helped him. He reports that he has tried to purchase ketamine online. Wife states she worries for his saftey if he is sent home.

## 2019-06-26 NOTE — ED PROVIDER NOTE - ATTENDING CONTRIBUTION TO CARE
Britni Wallace MD - Attending Physician: I have personally seen and examined this patient with the resident/fellow.  I have fully participated in the care of this patient. I have reviewed all pertinent clinical information, including history, physical exam, plan and the Resident/Fellow’s note and agree except as noted. See MDM

## 2019-06-27 VITALS
HEART RATE: 68 BPM | TEMPERATURE: 98 F | DIASTOLIC BLOOD PRESSURE: 81 MMHG | RESPIRATION RATE: 18 BRPM | SYSTOLIC BLOOD PRESSURE: 133 MMHG | OXYGEN SATURATION: 99 %

## 2019-06-27 DIAGNOSIS — F32.9 MAJOR DEPRESSIVE DISORDER, SINGLE EPISODE, UNSPECIFIED: ICD-10-CM

## 2019-06-27 NOTE — ED BEHAVIORAL HEALTH ASSESSMENT NOTE - DESCRIPTION (FIRST USE, LAST USE, QUANTITY, FREQUENCY, DURATION)
Was smoking 2 PPD, 6 months ago started to use his Vape pen exclusively but states he smokes it "continuously" Pt reports hx of use, denies recent Hx of use, denies any in the last decade Long hx of Opiate abuse, both illicit and prescribed, hx of being on Suboxone Hx of overdosing on Xanax, likely pt was abusing them prior to that incident (2012). Had a positive tox for BZD a few days ago but pt denies intentionally ingesting any (believes it was the drug from [place] he purchased online) Wife reports pt has been using Kratom, "wild lettuce". Pt admits to buying a drug from [place] which he believed to be Ketamine.

## 2019-06-27 NOTE — ED BEHAVIORAL HEALTH ASSESSMENT NOTE - CASE SUMMARY
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    Patient oddly related in affect however does not appear psychotic or manic. His thought process and judgement appear off and without clear reasoning as to why he is feeling depressed and "off." He could benefit from further diagnostic clarification, detox from substances, and starting medication for reported depressive symptoms. Unclear ability to contract for safety at this time.

## 2019-06-27 NOTE — ED BEHAVIORAL HEALTH ASSESSMENT NOTE - LEGAL HISTORY
Per wife - Pt. has legal hx of many many tickets and court dates for speeding, reckless endangerment,  and unlicensed operation from when his license had been revoked a few months ago Per wife - Pt. has legal hx of many many tickets and court dates for speeding, reckless endangerment,  and unlicensed operation from when his license had been revoked a few months ago. Per jakub - pt arrested 04/16/19, charged with Aggravated unlicensed operation of vehicle and speed violation. Next court date 08/12/19

## 2019-06-27 NOTE — ED BEHAVIORAL HEALTH ASSESSMENT NOTE - HPI (INCLUDE ILLNESS QUALITY, SEVERITY, DURATION, TIMING, CONTEXT, MODIFYING FACTORS, ASSOCIATED SIGNS AND SYMPTOMS)
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, with a PPH of Mood d/o NOS, childhood dx of ADHD, Polysubstance abuse (Opiates, BZDs, THC, and per wife recently using Kratom, Chinese herbs, "wild lettuce" and occasional ETOH), hx of TBI at 20 years old, one prior hospitalization at  in 03/2012 after an OD on Xanax (reportedly accidental - see below), no current outpatient treatment, denies prior suicide attempts (wife reports hx of cutting as a teen), denies hx of violence, + arrests mostly for motor vehicle tickets, denies trauma, with a past medical history of Chronic pain, brought in by wife, presenting with depression and questionable SI, in the context of chronic pain.    See Past Psych Hx for full CVM review - Pt attended [facility] 04/27/12-11/25/16 but was discharged from care after he relapsed on opiates. Per d/c summary - Pt experienced a TBI at 20 years old after he fell off roof of 2 story house. Since then he has struggled with chronic pain and periods of increased irritability and anxiety. Pt was self-medicating with opiates but subsequently was prescribed Oxycontin 80mg BID, asked for more but was started on Xanax 0.25mg PRN instead. In 03/2012, pt ran out of opiates, refilled his Xanax rx, took 2 pills and then drove to  hospital where his wife worked. He subsequently took the entire bottle of Xanax (120 tabs of 0.5mg tabs, 10 remaining in bottle). He denied that this was suicidal but does not recall doing it. Pt also left incoherent and derogatory vmail on his psychiatrist's voicemail. He was involuntarily admitted and d/c on Zoloft and Abilify. Prior trial of Prozac. While at [facility] pt was stabilized on Suboxone and Adderall but ultimately relapsed on Opiates and declined transfer to detox/rehab. Case was closed.    On exam today pt is oddly related, often providing answers with a questioning inflection as if he is unsure of his response. He has an incongruent affect (?pseudobulbar affect) and is somewhat minimizing and evasive. Pt reports that as a result of chronic pain he is depressed. Pt struggles to give s/s of depression, states "my depression is based on the facts and I'm useless". Pt states "I'm probably going to quit my job, sit in the basement and watch TV all day". Pt states he has been calling out of work for the past 2 weeks because he does not have motivation to attend work, admits that this may result in him being fired but does not seem to care. He appears apathetic and while he denies current active SI he states he does have thoughts to kill himself "yet" and describes passive SI. Pt often puts himself down, states "I suck" and is hopeless/helpless. He reports poor sleep with only 3-4 hours per night. He denies s/s of alex or psychosis, denies A/VH or delusions, none elicited on exam. Pt reports he is not currently in outpt treatment and only takes Vitamin D and Testosterone IM which he buys on the street "because it's cheaper". Pt states he has not purchased illicit opiates within the last 4 years but has been purchasing a substance from [place] which he believed was Ketamine. Pt "passed out" a couple days ago and was seen at [facility] where his urine was positive for Benzos. Pt denies intentionally using BZDs and thinks the substance he got from [place] may have had something in it. Pt reports that a Neurologist recently had him on Tegretol and also suggested prescription THC/CBD but pt declined stating it does not help his pain. Pt stopped taking the Tegretol about 1-2 months ago but doesn't feel that it has impacted his pain or mood.    On review, pt denies any hx of violence or aggression. Denies hx of suicide attempts but admits to an "accidental" Xanax OD as described above. He denies legal issues outside of being caught with weed once when he was minor ([employer] shows multiple tickets for speeding). Denies access to guns/weapons (wife reports access to BB guns and machetes). Denies hx of trauma.    Collateral from wife - see  note. Wife has acute safety concerns and does not feel safe with pt returning home. 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 and 17 yo daughter, with a PPH of Mood d/o NOS, childhood dx of ADHD, Polysubstance abuse (Opiates, BZDs, THC, and per wife recently using Kratom, Chinese herbs, "wild lettuce" and occasional ETOH), hx of TBI at 20 years old, one prior hospitalization at  in 03/2012 after an OD on Xanax (reportedly accidental - see below), no current outpatient treatment, denies prior suicide attempts (wife reports hx of cutting as a teen), denies hx of violence, + arrests mostly for motor vehicle tickets, denies trauma, with a past medical history of Chronic pain 2/2 TBI at 20 years old, benign pituitary tumor, Low testosterone, brought in by wife, presenting with depression and questionable SI, in the context of chronic pain.    See Past Psych Hx for full CVM review - Pt attended [facility] 04/27/12-11/25/16 but was discharged from care after he relapsed on opiates. Per d/c summary - Pt experienced a TBI at 20 years old after he fell off roof of 2 story house. Since then he has struggled with chronic pain and periods of increased irritability and anxiety. Pt was self-medicating with opiates but subsequently was prescribed Oxycontin 80mg BID, asked for more but was started on Xanax 0.25mg PRN instead. In 03/2012, pt ran out of opiates, refilled his Xanax rx, took 2 pills and then drove to  hospital where his wife worked. He subsequently took the entire bottle of Xanax (120 tabs of 0.5mg tabs, 10 remaining in bottle). He denied that this was suicidal but does not recall doing it. Pt also left incoherent and derogatory vmail on his psychiatrist's voicemail. He was involuntarily admitted and d/c on Zoloft and Abilify. Prior trial of Prozac. While at [facility] pt was stabilized on Suboxone and Adderall but ultimately relapsed on Opiates and declined transfer to detox/rehab. Case was closed.    On exam today pt is oddly related, often providing answers with a questioning inflection as if he is unsure of his response. He has an incongruent affect (?pseudobulbar affect) and is somewhat minimizing and evasive. Pt reports that as a result of chronic pain he is depressed. Pt struggles to give s/s of depression, states "my depression is based on the facts and I'm useless". Pt states "I'm probably going to quit my job, sit in the basement and watch TV all day". Pt states he has been calling out of work for the past 2 weeks because he does not have motivation to attend work, admits that this may result in him being fired but does not seem to care. He appears apathetic and while he denies current active SI he states he does have thoughts to kill himself "yet" and describes passive SI. Pt often puts himself down, states "I suck" and is hopeless/helpless. He reports poor sleep with only 3-4 hours per night. He denies s/s of alex or psychosis, denies A/VH or delusions, none elicited on exam. Pt reports he is not currently in outpt treatment and only takes Vitamin D and Testosterone IM which he buys on the street "because it's cheaper". Pt states he has not purchased illicit opiates within the last 4 years but has been purchasing a substance from [place] which he believed was Ketamine. Pt "passed out" a couple days ago and was seen at [facility] where his urine was positive for Benzos. Pt denies intentionally using BZDs and thinks the substance he got from [place] may have had something in it. Pt reports that a Neurologist recently had him on Tegretol and also suggested prescription THC/CBD but pt declined stating it does not help his pain. Pt stopped taking the Tegretol about 1-2 months ago but doesn't feel that it has impacted his pain or mood.    On review, pt denies any hx of violence or aggression. Denies hx of suicide attempts but admits to an "accidental" Xanax OD as described above. He denies legal issues outside of being caught with weed once when he was minor ([employer] shows multiple tickets for speeding). Denies access to guns/weapons (wife reports access to BB guns and machetes). Denies hx of trauma.    Collateral from wife - see  note. Wife has acute safety concerns and does not feel safe with pt returning home. 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 and 17 yo daughter, with a PPH of Mood d/o NOS, childhood dx of ADHD, Polysubstance abuse (Opiates, BZDs, THC, and per wife recently using Kratom, Chinese herbs, "wild lettuce" and occasional ETOH), hx of TBI at 20 years old, one prior hospitalization at  in 03/2012 after an OD on Xanax (reportedly accidental - see below), no current outpatient treatment, denies prior suicide attempts (wife reports hx of cutting as a teen), denies hx of violence, + arrests mostly for motor vehicle tickets, denies trauma, with a past medical history of Chronic pain 2/2 TBI at 20 years old, benign pituitary tumor, Low testosterone, brought in by wife, presenting with depression and questionable SI, in the context of chronic pain.    See Past Psych Hx for full CVM review - Pt attended [facility] 04/27/12-11/25/16 but was discharged from care after he relapsed on opiates. Per d/c summary - Pt experienced a TBI at 20 years old after he fell off roof of 2 story house. Since then he has struggled with chronic pain and periods of increased irritability and anxiety. Pt was self-medicating with opiates but subsequently was prescribed Oxycontin 80mg BID, asked for more but was started on Xanax 0.25mg PRN instead. In 03/2012, pt ran out of opiates, refilled his Xanax rx, took 2 pills and then drove to  hospital where his wife worked. He subsequently took the entire bottle of Xanax (120 tabs of 0.5mg tabs, 10 remaining in bottle). He denied that this was suicidal but does not recall doing it. Pt also left incoherent and derogatory vmail on his psychiatrist's voicemail. He was involuntarily admitted and d/c on Zoloft and Abilify. Prior trial of Prozac. While at [facility] pt was stabilized on Suboxone and Adderall but ultimately relapsed on Opiates and declined transfer to detox/rehab. Case was closed.    On exam today pt is oddly related, often providing answers with a questioning inflection as if he is unsure of his response. He has an incongruent affect (?pseudobulbar affect) and is somewhat minimizing and evasive. Pt reports that as a result of chronic pain he is depressed. Pt struggles to give s/s of depression, states "my depression is based on the facts and I'm useless". Pt states "I'm probably going to quit my job, sit in the basement and watch TV all day". Pt states he has been calling out of work for the past 2 weeks because he does not have motivation to attend work, admits that this may result in him being fired but does not seem to care. He appears apathetic and while he denies current active SI he states he "doesn't have thoughts to kill [himself] yet" and describes passive SI. Pt often puts himself down, states "I suck" and is hopeless/helpless. He reports poor sleep with only 3-4 hours per night. He denies s/s of alex or psychosis, denies A/VH or delusions, none elicited on exam. Pt reports he is not currently in outpt treatment and only takes Vitamin D and Testosterone IM which is prescribed but he buys on the street "because it's cheaper". Pt states he has not purchased illicit opiates within the last 4 years but has been purchasing a substance from [place] which he believed was Ketamine. Pt "passed out" a couple days ago and was seen at [facility] where his urine was positive for Benzos. Pt denies intentionally using BZDs and thinks the substance he got from [place] may have had something in it. Pt reports that a Neurologist recently had him on Tegretol and also suggested prescription THC/CBD but pt declined stating it does not help his pain. Pt stopped taking the Tegretol about 1-2 months ago but doesn't feel that it has impacted his pain or mood.    On review, pt denies any hx of violence or aggression. Denies hx of suicide attempts but admits to an "accidental" Xanax OD as described above. He denies legal issues outside of being caught with weed once when he was minor. Denies access to guns/weapons (wife reports access to BB guns and machetes). Denies hx of trauma.    Collateral from wife - see  note. Wife has acute safety concerns and does not feel safe with pt returning home.

## 2019-06-27 NOTE — ED BEHAVIORAL HEALTH ASSESSMENT NOTE - DETAILS
9 and 16 year olds Pt and wife Questionable overdose in 2012 on Xanax which pt denies was suicidal paternal grandmother- bipolar, sister- bipolar w. psychosis Per wife - pt has access to BB guns and Machetes/knives Chronic diffuse body pain [place] reviewed case

## 2019-06-27 NOTE — ED BEHAVIORAL HEALTH ASSESSMENT NOTE - PSYCHIATRIC ISSUES AND PLAN (INCLUDE STANDING AND PRN MEDICATION)
Will defer standing meds to accepting hospital. Would consider medications which can be used for pain and mood such as Cymbalta, Tegretol or Neurontin. PRNs per facility

## 2019-06-27 NOTE — ED BEHAVIORAL HEALTH NOTE - BEHAVIORAL HEALTH NOTE
[facility] spoke with Isabella Ellis, [phone], pt's wife      Per wife HPI starts on Monday, prior to which pt. was at baseline.  At baseline pt. has substance abuse issue for 20+ years because he self-medicates for chronic pain (with a number of substances from the street and other countries); but is able to take care of himself and attend work at [employer] as a .  Wife shares pt. is not currently engaged in psychiatric care, having previously seen a psychiatrist at [facility] in 2012 (primarily for suboxone; suboxone "destroyed the pt’s body" according to wife, caused extreme weight loss, drop in testosterone). Per wife, pt. has hx of fibromyalgia, chronic pain, ADD/ADHD, benign pituitary tumor; pt. prescribed by PCP Dr. Francois doxycycline 100mg BID for tick bite, 50,000IU vitamin D, testosterone gel (but pt. takes street injection instead).  Wife reports pt. currently prescribed Tegretol by a neurologist who's name she cannot recall (pt compliant for a few months, stopped taking recently to her knowledge).  Per wife pt. is a “pill popper” and “will try anything he can get that may relieve the pain”, including pills he buys from friends, Kratom, wild lettuce, Chinese experimental drugs, marijuana.  Per wife pt. smoked 2-3 packs of cigarettes a day until ~4 months ago when he started using an e-cigarette instead, she reports pt. does not usually drink and will have 1 drink every few weeks with last drink having been this past Sunday.  Wife states that on Monday pt. was found unconscious on floor of their home by his mother who called EMS, pt. admitted overnight to [facility] for monitoring due to fever of 102, dehydration, 1.5 creatinine level,  positive benzodiazepine reading (reportedly due to the [place] experimental medication); wife sharing she does not believe any possible overdose of this drug was intentional.  Pt. was discharged from [facility] last night and returned home without issue.  This morning, wife reports she thought pt. had gone to work because his car was gone, but ~5 hrs later she realized he was laying on the floor in the unfinished basement, and that his car was gone from the driveway because it had been repossessed due to his not making payments on it.   When wife located pt. he was crying, reportedly stating he was in too much pain.  Per wife the two jokingly talked about their life insurance policies, making light of the frustrating situation they are both in managing his pain (this is what is referenced in the triage note - she denies there was any type of serious suicide pact between them).  Per wife pt. has never attempted suicide, does not endorse SI to her, does have self-harm hx dating back to teenage years of superficial cutting, no recent self-harm behaviors.  Per wife pt has family hx of paternal grandmother with bipolar and numerous [place] hospitalizations, and sister who has bipolar with psychosis. Pt. has legal hx of "many many tickets" and court dates for speeding, reckless endangerment,  and unlicensed operation from when his license had been revoked a few months ago. Pt. does have access to a number of firearms, lots of BB guns hoarded and machetes/knives per wife.  Wife expresses strong feelings that pt. is a danger to himself, that she does not think he can return home in current state, and that he would benefit from an inpatient hospitalization as she is worried for his safety.

## 2019-06-27 NOTE — ED BEHAVIORAL HEALTH ASSESSMENT NOTE - SUBSTANCE ISSUES AND PLAN (INCLUDE STANDING AND PRN MEDICATION)
Pt not currently in BZD or Opiate withdrawal, Utox negative. Can do CIWA with symptom triggered coverage.

## 2019-06-27 NOTE — ED BEHAVIORAL HEALTH ASSESSMENT NOTE - DESCRIPTION
Pt was in ED ~2.5 hours prior to telepsychiatry consult which was requested at 22:29 and started at 23:40. Per RN Delmer pt. arrived as walk-in with his wife who remained at bedside until ~22:30.  RN reports pt. has been calm and cooperative, complied with triage process, provided routine labs and urine willingly, allowed gowning and security wanding without incident. Pt. has been in behavioral control, has not required medication or security intervention. Per triage note pt denies psychiatric symptoms and SI/HI AH/VH; however pt’s wife stated that pt. does have SI. Pt’s speech noted to be of at normal volume and rate with thought content seeming logical and linear in nature.  Pt’s mood described as depressed with blunted affect but was observed during telepsych exam to  be incongruent and bizarre, laughing oddly at times. Per RN pt/wife relayed that they tried to get admission for pt. at [facility] but were told he would need medical clearance.  Pt’s wife no longer present in ED but interactions reported to have been positive.    Vital Signs Last 24 Hrs  T(C): 36.7 (27 Jun 2019 00:31), Max: 36.8 (26 Jun 2019 20:12)  T(F): 98.1 (27 Jun 2019 00:31), Max: 98.2 (26 Jun 2019 20:12)  HR: 69 (27 Jun 2019 00:31) (69 - 78)  BP: 117/77 (27 Jun 2019 00:31) (117/77 - 142/83)  BP(mean): --  RR: 18 (26 Jun 2019 20:12) (18 - 18)  SpO2: 98% (26 Jun 2019 20:12) (98% - 98%) Chronic pain 2/2 TBI at 20 years old  male, 2 children (7, 16), works for [employer] has supportive family and colleagues Chronic pain 2/2 TBI at 20 years old, benign pituitary tumor, Low testosterone

## 2019-06-27 NOTE — ED ADULT NURSE REASSESSMENT NOTE - NS ED NURSE REASSESS COMMENT FT1
pt. has been admitted to [facility] on a voluntary status, report given to DERRICK Gomez @ [facility] ([phone]). [facility] ems was called for transport. pt. remains on 1:1 CO for s/i.

## 2019-06-27 NOTE — ED BEHAVIORAL HEALTH ASSESSMENT NOTE - RISK ASSESSMENT
Risk factors: Chronic mental illness, mood episode, passive SI, alcohol/substance use disorder, chronic pain/acute medical problems, access to means, prior history of OD which is suspicious for suicide attempt (although pt denies this), insomnia, anhedonia, impulsivity, not engaged in outpatient psychiatric treatment, noncompliance with treatment or medications, hopelessness/despair, limited of insight into illness.    Protective factors: Medically stable, no current active SI/HI/I/P or urges to harm self/others, intact family and social supports, residential stability, help seeking behaviors.    Acute Risk (harm to self/others, inability to care for self)  (X) High   (  ) Moderate   (  ) Low   (  ) Unable to determine   Rationale - See Above Risk/Protective Factors    Elevated Chronic Risk   (  ) No    (X) Yes  Details - See Above Risk/Protective Factors

## 2019-06-27 NOTE — ED BEHAVIORAL HEALTH ASSESSMENT NOTE - MEDICAL ISSUES AND PLAN (INCLUDE STANDING AND PRN MEDICATION)
Per wife pt recently on Doxycycline 100mg BID for tick bite - will defer to medical team as to continuation; Vitamin D 50,000U weekly. Would obtain medical consult to determine need for Testosterone which pt has been taking illicit but reports dx of Low T. Recommend pain management consult.

## 2019-06-27 NOTE — ED BEHAVIORAL HEALTH ASSESSMENT NOTE - OTHER
15 Susan Fink Very oddly related Bizarre - smiling/laughing at times, alternatively appearing depressed/constricted at times as well Possible Pseudobulbar affect? Substance use; Chronic pain Bizarre - smiling/laughing at times, alternatively appearing depressed/constricted at times external

## 2019-06-27 NOTE — ED BEHAVIORAL HEALTH ASSESSMENT NOTE - OTHER PAST PSYCHIATRIC HISTORY (INCLUDE DETAILS REGARDING ONSET, COURSE OF ILLNESS, INPATIENT/OUTPATIENT TREATMENT)
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 Per patient, he has struggled with chronic pain and resultant periods of increased irritability and anxiety since falling from the roof of a 2 story house when he was 20. He has intermittently been prescribed pain medication such as Demerol and later opiates. When he was not in formal treatment, 3 years ago he began self-medicating exclusively with opiates that he bought off of the street, later mainly using approximately 100mg daily of oxycontin or roxycodone ($300/week). 1.5 years ago, he began treatment with Dr. Parikh ([phone]), who prescribed Oxycontin 80mg PO BID. Patient kept asking for an increase in dose but instead was also started on Xanax 0.25mg PO prn. He began treatment 9 months ago with a psychiatrist, Dr. Desai ([phone]), who assumed responsiblity for prescribing Xanax, in addition to treating patient with Prozac 50mg daily. Xanax was titrated up to 2mg daily (0.5mg PO QAM, once in afternoon prn, and 2 tabs qhs). On 3/30, patient states he had been out of his oxycontin for >24 hours and was in pain, anxious, irritable. He went to the pharmacy to  his Xanax prescription. He immediately took 2 tabs in the parking lot, got in his car and tried calling his psychiatrist. He does not recall this but was told he left her a voicemail calling her many different derogatory names and was threatening. He also does not recall the following but was told later that he drove his car to [facility], where his wife works as an RN, and was slurring his speech and incoherent. He apparently took almost his entire bottle of Xanax (of 120 0.5mg tabs, 10 were in the bottle). He emphatically denies ever wanting to die or ever having suicidal thoughts over the past year. He thinks he must have taken it because "I was feeling like such crap and wanted to make sure the meds worked." This overdose, in conjunction with the threatening and inhcoherent message he left on his psychiatrist’s voicemail, led to his being involuntarily hospitalized. He was hospitalized for approximately 2 weeks and, per patient, was started on Suboxone 8mg/2mg tabs. Xanax was discontinued. He was discharged on an unknown dose of Zoloft as well as on Abilify 10mg qhs. Patient states he was supposed to follow up with outpatient care at [facility] and went for an intake but "the doctor didn’t like some of the stuff I said and told me to leave." He ran out of suboxone 5 days ago and began feeling irritable, anxious. He had initial insomnia, some abdominal cramping and diarrhea, and his palms grew sweaty. He tried finding a doctor to prescribe the medication for him and saw a Dr. Homero Johnston in [place] ([phone]), who prescribed a 30 day supply of films of 8m/2mg suboxone. He now feels much calmer and is not experiencing any increased anxiety or other withdrawal symptoms. He denies current symptoms of depression, alex, psychosis, OCD. He cites his life philosophy as one of "nihilism" but denies actually feeling depressed or sad over this outlook and does enjoy life and spending time with his family and his friends. Regarding ADHD symptoms, patient states he is able to focus well for short tasks but that his concentration/focus easily wanes if he has to read anything longer, whether at work or whether at home (like a novel vs a magazine).    Course of treatment at [facility] from 2012 - 2016: Patient was prescribed Suboxone while at program. Client was adhering to treatment recommendations but then relapsed on opiates. Client, worker and psychiatrist met with client to determine his needs. Client reported that he feels the he requires pain management treatment since he was recently diagnosed with a medical condition. Worker and MD discussed concerns and recommended detox/rehab. Client declined and stated that he will follow up with outside MD for pain management referral. Client was advised that case would be closed.    Dx: Polysubstance dependence, Mood d/o NOS, ADHD NOS

## 2019-06-27 NOTE — ED BEHAVIORAL HEALTH ASSESSMENT NOTE - DIFFERENTIAL
Depression - MDD vs Depression 2/2 TBI vs Substance induced mood d/o vs Bipolar d/o - mixed episode?  r/o Personality d/o

## 2019-06-27 NOTE — ED BEHAVIORAL HEALTH ASSESSMENT NOTE - SUMMARY
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, with a PPH of Mood d/o NOS, childhood dx of ADHD, Polysubstance abuse (Opiates, BZDs, THC, and per wife recently using Kratom, Chinese herbs, "wild lettuce" and occasional ETOH), hx of TBI at 20 years old, one prior hospitalization at  in 03/2012 after an OD on Xanax (reportedly accidental - see below), no current outpatient treatment, denies prior suicide attempts (wife reports hx of cutting as a teen), denies hx of violence, + arrests mostly for motor vehicle tickets, denies trauma, with a past medical history of Chronic pain, brought in by wife, presenting with depression and questionable SI, in the context of chronic pain.    On evaluation pt is very oddly related with an affect that is suspicious for pseudobulbar affect possibly related to his TBI at 20 years old. Pt has a long hx of polysubstance abuse including Opiates and BZDs and more recently has been purchasing things online like Kratom and Chinese herbs which are possibly contributing to his presentation. Pt reports depression 2/2 chronic pain and endorses passive SI. He denies active SI but states he doesn't want to kill himself "yet". Pt hopeless/helpless, describes himself as "useless" and has not been attending work or functioning at his baseline. Pt's wife has significant safety concerns and pt is amenable to admission for safety and stabilization at this time. Will admit on 9.13 basis. 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, with a PPH of Mood d/o NOS, childhood dx of ADHD, Polysubstance abuse (Opiates, BZDs, THC, and per wife recently using Kratom, Chinese herbs, "wild lettuce" and occasional ETOH), hx of TBI at 20 years old, one prior hospitalization at  in 03/2012 after an OD on Xanax (reportedly accidental - see below), no current outpatient treatment, denies prior suicide attempts (wife reports hx of cutting as a teen), denies hx of violence, + arrests mostly for motor vehicle tickets, denies trauma, with a past medical history of Chronic pain 2/2 TBI at 20 years old, benign pituitary tumor, Low testosterone, brought in by wife, presenting with depression and questionable SI, in the context of chronic pain.    On evaluation pt is very oddly related with an affect that is suspicious for pseudobulbar affect possibly related to his TBI at 20 years old. Pt has a long hx of polysubstance abuse including Opiates and BZDs and more recently has been purchasing things online like Kratom and Chinese herbs which are possibly contributing to his presentation. Pt reports depression 2/2 chronic pain and endorses passive SI. He denies active SI but states he doesn't want to kill himself "yet". Pt hopeless/helpless, describes himself as "useless" and has not been attending work or functioning at his baseline. Pt's wife has significant safety concerns and pt is amenable to admission for safety and stabilization at this time. Will admit on 9.13 basis.

## 2019-06-27 NOTE — ED BEHAVIORAL HEALTH ASSESSMENT NOTE - SUICIDE RISK FACTORS
Mood episode/Highly impulsive behavior/Anhedonia/Hopelessness/Chronic pain or acute medical issue/Access to means (pills, firearms, etc.)/Substance abuse/dependence

## 2019-06-27 NOTE — ED BEHAVIORAL HEALTH ASSESSMENT NOTE - CURRENT MEDICATION
Multiple illicit substances including Testosterone (reports Low T), Kratom, ?Ketamine or possible BZD. Pt reports he is also taking Vitamin D Multiple illicit substances including Testosterone (reports Low T), Kratom, ?Ketamine or possible BZD. Pt reports he is also taking Vitamin D (wife states it is 50,000U likely weekly) and Doxycycline 100mg BID s/p tick bite.     [facility] Reference #: [id number]  06/11/2018 07/09/2018 testosterone 30 mg/1.5 ml pump  90ml 30 Warner Conner MD

## 2019-07-08 ENCOUNTER — APPOINTMENT (OUTPATIENT)
Dept: FAMILY MEDICINE | Facility: CLINIC | Age: 39
End: 2019-07-08

## 2019-07-10 ENCOUNTER — APPOINTMENT (OUTPATIENT)
Dept: FAMILY MEDICINE | Facility: CLINIC | Age: 39
End: 2019-07-10

## 2025-07-08 NOTE — ED ADULT NURSE NOTE - BREATH SOUNDS, MLM
What Type Of Note Output Would You Prefer (Optional)?: Bullet Format How Severe Is Your Skin Lesion?: mild Has Your Skin Lesion Been Treated?: been treated Is This A New Presentation, Or A Follow-Up?: Skin Lesion Clear